# Patient Record
Sex: FEMALE | Race: WHITE | NOT HISPANIC OR LATINO | Employment: FULL TIME | URBAN - METROPOLITAN AREA
[De-identification: names, ages, dates, MRNs, and addresses within clinical notes are randomized per-mention and may not be internally consistent; named-entity substitution may affect disease eponyms.]

---

## 2019-11-04 ENCOUNTER — OFFICE VISIT (OUTPATIENT)
Dept: INTERNAL MEDICINE | Facility: CLINIC | Age: 44
End: 2019-11-04

## 2019-11-04 VITALS
BODY MASS INDEX: 37.14 KG/M2 | SYSTOLIC BLOOD PRESSURE: 110 MMHG | TEMPERATURE: 98.3 F | HEART RATE: 83 BPM | WEIGHT: 209.6 LBS | OXYGEN SATURATION: 98 % | HEIGHT: 63 IN | DIASTOLIC BLOOD PRESSURE: 80 MMHG

## 2019-11-04 DIAGNOSIS — R53.83 FATIGUE, UNSPECIFIED TYPE: ICD-10-CM

## 2019-11-04 DIAGNOSIS — Z00.00 HEALTH CARE MAINTENANCE: Primary | ICD-10-CM

## 2019-11-04 DIAGNOSIS — R73.03 PREDIABETES: ICD-10-CM

## 2019-11-04 PROBLEM — G47.33 OSA (OBSTRUCTIVE SLEEP APNEA): Status: ACTIVE | Noted: 2019-11-04

## 2019-11-04 PROBLEM — M06.9 RHEUMATOID ARTHRITIS: Status: ACTIVE | Noted: 2019-11-04

## 2019-11-04 PROBLEM — Z87.898 HISTORY OF SEIZURE: Status: ACTIVE | Noted: 2019-11-04

## 2019-11-04 PROBLEM — G43.909 MIGRAINES: Status: ACTIVE | Noted: 2019-11-04

## 2019-11-04 PROCEDURE — 99386 PREV VISIT NEW AGE 40-64: CPT | Performed by: NURSE PRACTITIONER

## 2019-11-04 RX ORDER — FOLIC ACID 1 MG/1
1 TABLET ORAL 2 TIMES WEEKLY
COMMUNITY
Start: 2019-10-05

## 2019-11-04 RX ORDER — MEDROXYPROGESTERONE ACETATE 150 MG/ML
INJECTION, SUSPENSION INTRAMUSCULAR
Status: ON HOLD | COMMUNITY
Start: 2019-10-09 | End: 2022-12-19

## 2019-11-04 NOTE — PROGRESS NOTES
Subjective   Lauren Chicas is a 44 y.o. female.     History of Present Illness   The patient is here today for CPE and lab work F/U. To establish care. Has been a few years ago since last PCP.     RA- Dr. Ivette Tang, dx May 2012, feels well controlled, has had to switch meds recently.   Migraines- much better with feverfew  GYN- Olive Armas, G- 2 P- 2, Periods regular, moderate, 4-5 days  MICHAEL- using CPAP, Dr. Pena, [facility]  Biometric screening 08/30/2019- A1c 5.8, fasting glucose 101  Cholesterol ok per patient  The following portions of the patient's history were reviewed and updated as appropriate: allergies, current medications, past family history, past medical history, past social history, past surgical history and problem list.    Review of Systems   Constitutional: Positive for fatigue. Negative for chills and fever.   HENT: Negative for ear pain, rhinorrhea and sore throat.    Eyes: Negative.    Respiratory: Negative for cough and shortness of breath.    Cardiovascular: Negative.    Gastrointestinal: Negative.    Endocrine: Negative for cold intolerance and heat intolerance.   Genitourinary: Negative for breast discharge, breast lump, breast pain, difficulty urinating, dyspareunia, dysuria, flank pain, frequency, genital sores, hematuria, pelvic pain and urgency.   Musculoskeletal: Positive for arthralgias (baseline).   Skin: Positive for rash (will have random short term rashes ).   Allergic/Immunologic: Positive for environmental allergies. Negative for food allergies.   Neurological: Negative.    Hematological: Negative.    Psychiatric/Behavioral: Negative for dysphoric mood and suicidal ideas. The patient is not nervous/anxious.        Objective   Physical Exam   Constitutional: She is oriented to person, place, and time. Vital signs are normal. She appears well-developed and well-nourished. She is cooperative.   Body mass index is 37.13 kg/m².     HENT:   Right Ear: Hearing, tympanic membrane, external  ear and ear canal normal.   Left Ear: Hearing, tympanic membrane, external ear and ear canal normal.   Nose: Nose normal.   Mouth/Throat: Uvula is midline, oropharynx is clear and moist and mucous membranes are normal.   Eyes: Conjunctivae, EOM and lids are normal. Pupils are equal, round, and reactive to light.   Neck: Trachea normal and normal range of motion. Carotid bruit is not present. No thyroid mass and no thyromegaly present.   Cardiovascular: Normal rate, regular rhythm, normal heart sounds and normal pulses.   Pulmonary/Chest: Effort normal and breath sounds normal.   Abdominal: Soft. Normal appearance, normal aorta and bowel sounds are normal. There is no hepatosplenomegaly. There is no tenderness.   Musculoskeletal: Normal range of motion.   Lymphadenopathy:     She has no cervical adenopathy.     She has no axillary adenopathy. No inguinal adenopathy noted on the right or left side.        Right: No inguinal and no supraclavicular adenopathy present.        Left: No inguinal and no supraclavicular adenopathy present.   Neurological: She is alert and oriented to person, place, and time. She has normal strength. No cranial nerve deficit or sensory deficit. She displays a negative Romberg sign. GCS eye subscore is 4. GCS verbal subscore is 5. GCS motor subscore is 6.   Reflex Scores:       Patellar reflexes are 2+ on the right side and 2+ on the left side.  Skin: Skin is warm, dry and intact.   Psychiatric: She has a normal mood and affect. Her speech is normal and behavior is normal. Judgment and thought content normal. Cognition and memory are normal.       Assessment/Plan   There are no diagnoses linked to this encounter.             1. Preventative counseling- she will work on exercise, healthy diet and wt loss  2. Fatigue- check B12 level   3. Prediabetes- recent A1C elevated, work on wt loss, she will go to DM education classes and will recheck labs in 3 months.     Flu vaccine- discussed  Wears  sunscreen   Derm- UTD

## 2019-12-10 ENCOUNTER — TELEPHONE (OUTPATIENT)
Dept: INTERNAL MEDICINE | Facility: CLINIC | Age: 44
End: 2019-12-10

## 2019-12-10 NOTE — TELEPHONE ENCOUNTER
Patient said Rheumatologist was supposed to send lab results to us. We have not received them as of yet. Patient will call rheumatology office to send another request

## 2019-12-10 NOTE — TELEPHONE ENCOUNTER
----- Message from Santhosh Medina Rep sent at 12/9/2019  3:15 PM EST -----  Contact: [phone]  Pt called because her hematologist  sent us her lab results and her hemogloban is low she wants to know if that is why she is so tired all the time

## 2019-12-11 DIAGNOSIS — D64.9 ANEMIA, UNSPECIFIED TYPE: Primary | ICD-10-CM

## 2019-12-15 LAB
BASOPHILS # BLD AUTO: 0 X10E3/UL (ref 0–0.2)
BASOPHILS NFR BLD AUTO: 0 %
EOSINOPHIL # BLD AUTO: 0.3 X10E3/UL (ref 0–0.4)
EOSINOPHIL NFR BLD AUTO: 10 %
ERYTHROCYTE [DISTWIDTH] IN BLOOD BY AUTOMATED COUNT: 18.8 % (ref 12.3–15.4)
FERRITIN SERPL-MCNC: 19 NG/ML (ref 15–150)
HCT VFR BLD AUTO: 32.6 % (ref 34–46.6)
HGB BLD-MCNC: 10.6 G/DL (ref 11.1–15.9)
IMM GRANULOCYTES # BLD AUTO: 0 X10E3/UL (ref 0–0.1)
IMM GRANULOCYTES NFR BLD AUTO: 0 %
IRON SATN MFR SERPL: 12 % (ref 15–55)
IRON SERPL-MCNC: 38 UG/DL (ref 27–159)
LYMPHOCYTES # BLD AUTO: 0.8 X10E3/UL (ref 0.7–3.1)
LYMPHOCYTES NFR BLD AUTO: 25 %
MCH RBC QN AUTO: 26.4 PG (ref 26.6–33)
MCHC RBC AUTO-ENTMCNC: 32.5 G/DL (ref 31.5–35.7)
MCV RBC AUTO: 81 FL (ref 79–97)
MONOCYTES # BLD AUTO: 0.4 X10E3/UL (ref 0.1–0.9)
MONOCYTES NFR BLD AUTO: 12 %
NEUTROPHILS # BLD AUTO: 1.6 X10E3/UL (ref 1.4–7)
NEUTROPHILS NFR BLD AUTO: 53 %
PLATELET # BLD AUTO: 346 X10E3/UL (ref 150–450)
RBC # BLD AUTO: 4.01 X10E6/UL (ref 3.77–5.28)
TIBC SERPL-MCNC: 312 UG/DL (ref 250–450)
UIBC SERPL-MCNC: 274 UG/DL (ref 131–425)
WBC # BLD AUTO: 3.1 X10E3/UL (ref 3.4–10.8)

## 2019-12-16 DIAGNOSIS — D64.9 ANEMIA, UNSPECIFIED TYPE: Primary | ICD-10-CM

## 2020-01-24 DIAGNOSIS — R53.83 FATIGUE, UNSPECIFIED TYPE: ICD-10-CM

## 2020-01-24 DIAGNOSIS — R73.03 PREDIABETES: ICD-10-CM

## 2020-01-24 DIAGNOSIS — Z00.00 HEALTH CARE MAINTENANCE: ICD-10-CM

## 2020-01-28 LAB
ALBUMIN SERPL-MCNC: 4 G/DL (ref 3.8–4.8)
ALBUMIN/GLOB SERPL: 1.1 {RATIO} (ref 1.2–2.2)
ALP SERPL-CCNC: 43 IU/L (ref 39–117)
ALT SERPL-CCNC: 20 IU/L (ref 0–32)
AST SERPL-CCNC: 23 IU/L (ref 0–40)
BILIRUB SERPL-MCNC: 0.4 MG/DL (ref 0–1.2)
BUN SERPL-MCNC: 11 MG/DL (ref 6–24)
BUN/CREAT SERPL: 16 (ref 9–23)
CALCIUM SERPL-MCNC: 9.1 MG/DL (ref 8.7–10.2)
CHLORIDE SERPL-SCNC: 106 MMOL/L (ref 96–106)
CO2 SERPL-SCNC: 22 MMOL/L (ref 20–29)
CREAT SERPL-MCNC: 0.67 MG/DL (ref 0.57–1)
FOLATE SERPL-MCNC: 16.8 NG/ML
GLOBULIN SER CALC-MCNC: 3.7 G/DL (ref 1.5–4.5)
GLUCOSE SERPL-MCNC: 86 MG/DL (ref 65–99)
HBA1C MFR BLD: 5.6 % (ref 4.8–5.6)
POTASSIUM SERPL-SCNC: 4.4 MMOL/L (ref 3.5–5.2)
PROT SERPL-MCNC: 7.7 G/DL (ref 6–8.5)
SODIUM SERPL-SCNC: 141 MMOL/L (ref 134–144)
TSH SERPL DL<=0.005 MIU/L-ACNC: 0.87 UIU/ML (ref 0.45–4.5)
VIT B12 SERPL-MCNC: 473 PG/ML (ref 232–1245)

## 2020-02-20 ENCOUNTER — OFFICE VISIT (OUTPATIENT)
Dept: INTERNAL MEDICINE | Facility: CLINIC | Age: 45
End: 2020-02-20

## 2020-02-20 VITALS
HEART RATE: 94 BPM | SYSTOLIC BLOOD PRESSURE: 122 MMHG | WEIGHT: 213.4 LBS | OXYGEN SATURATION: 98 % | BODY MASS INDEX: 37.81 KG/M2 | TEMPERATURE: 98.9 F | HEIGHT: 63 IN | DIASTOLIC BLOOD PRESSURE: 80 MMHG

## 2020-02-20 DIAGNOSIS — M06.9 RHEUMATOID ARTHRITIS, INVOLVING UNSPECIFIED SITE, UNSPECIFIED RHEUMATOID FACTOR PRESENCE: ICD-10-CM

## 2020-02-20 DIAGNOSIS — R73.03 PREDIABETES: Primary | ICD-10-CM

## 2020-02-20 DIAGNOSIS — D50.9 IRON DEFICIENCY ANEMIA, UNSPECIFIED IRON DEFICIENCY ANEMIA TYPE: ICD-10-CM

## 2020-02-20 DIAGNOSIS — E66.01 CLASS 2 SEVERE OBESITY WITH SERIOUS COMORBIDITY AND BODY MASS INDEX (BMI) OF 37.0 TO 37.9 IN ADULT, UNSPECIFIED OBESITY TYPE (HCC): ICD-10-CM

## 2020-02-20 PROBLEM — E66.812 CLASS 2 SEVERE OBESITY WITH SERIOUS COMORBIDITY IN ADULT: Status: ACTIVE | Noted: 2020-02-20

## 2020-02-20 PROCEDURE — 99214 OFFICE O/P EST MOD 30 MIN: CPT | Performed by: NURSE PRACTITIONER

## 2020-02-20 NOTE — PROGRESS NOTES
"Subjective   Lauren Chicas is a 44 y.o. female.      History of Present Illness   The patient is here today to F/U on lab work. Feeling ok other than ankle injury.     Recently in a boot with tibial tendonitis. Has been on steroids recently with RA. To go back to ortho on Monday.     Prediabetes- struggling some with diet  MICHAEL- using CPAP nightly  Anemia- trying iron rich diet, feeling some better. Period last week, 2-3 days, heavy.     Since being back from trip feeling \"a little off\".  Maybe vertigo.     Just back from [place].   The following portions of the patient's history were reviewed and updated as appropriate: allergies, current medications, past family history, past medical history, past social history, past surgical history and problem list.    Review of Systems   Constitutional: Negative for chills and fever.   Respiratory: Negative.    Cardiovascular: Negative.    Musculoskeletal: Positive for arthralgias.   Psychiatric/Behavioral: Negative for dysphoric mood and suicidal ideas. The patient is not nervous/anxious.        Objective   Physical Exam   Constitutional: She appears well-developed and well-nourished.   Neck: Normal range of motion. Neck supple. No thyromegaly present.   Cardiovascular: Normal rate, regular rhythm, normal heart sounds and intact distal pulses.   Trace ankle edema   Pulmonary/Chest: Effort normal and breath sounds normal.   Lymphadenopathy:     She has no cervical adenopathy.   Skin: Skin is warm and dry.   Psychiatric: She has a normal mood and affect. Her behavior is normal. Judgment and thought content normal.       Vitals:    02/20/20 1526   BP: 122/80   Pulse: 94   Temp: 98.9 °F (37.2 °C)   SpO2: 98%     Body mass index is 37.81 kg/m².      Assessment/Plan   Lauren was seen today for prediabetes.    Diagnoses and all orders for this visit:    Prediabetes    Rheumatoid arthritis, involving unspecified site, unspecified rheumatoid factor presence (CMS/[facility])    Class 2 severe " obesity with serious comorbidity and body mass index (BMI) of 37.0 to 37.9 in adult, unspecified obesity type (CMS/[facility])               1. Prediabetes- work on returning to healthy diet, cooking at home and exercise  2. RA- UTD with rheum  3. Obesity- work on wt loss through portion control, watch sugars and carbs, if needed would try GLP1  4. Anemia- need CBC will request from rheum, needs FITs test     Flu vaccine- defers

## 2020-08-06 ENCOUNTER — TELEPHONE (OUTPATIENT)
Dept: INTERNAL MEDICINE | Facility: CLINIC | Age: 45
End: 2020-08-06

## 2020-08-06 DIAGNOSIS — R73.03 PREDIABETES: Primary | ICD-10-CM

## 2020-08-06 DIAGNOSIS — E66.01 CLASS 2 SEVERE OBESITY WITH SERIOUS COMORBIDITY AND BODY MASS INDEX (BMI) OF 37.0 TO 37.9 IN ADULT, UNSPECIFIED OBESITY TYPE (HCC): ICD-10-CM

## 2020-08-06 DIAGNOSIS — D50.9 IRON DEFICIENCY ANEMIA, UNSPECIFIED IRON DEFICIENCY ANEMIA TYPE: ICD-10-CM

## 2020-08-06 NOTE — TELEPHONE ENCOUNTER
Labs ordered.      ----- Message from EDWINA Alvarado sent at 8/6/2020 12:41 PM EDT -----  CBC, Iron panel, ferritin, CMP, A1C, LP  ----- Message -----  From: Janie Sykes  Sent: 8/6/2020  12:31 PM EDT  To: EDWINA Alvarado    Please advise for labs.

## 2021-09-27 ENCOUNTER — OFFICE VISIT (OUTPATIENT)
Dept: INTERNAL MEDICINE | Facility: CLINIC | Age: 46
End: 2021-09-27

## 2021-09-27 VITALS
HEART RATE: 79 BPM | TEMPERATURE: 97.5 F | WEIGHT: 214 LBS | HEIGHT: 63 IN | BODY MASS INDEX: 37.92 KG/M2 | DIASTOLIC BLOOD PRESSURE: 70 MMHG | SYSTOLIC BLOOD PRESSURE: 110 MMHG | OXYGEN SATURATION: 99 %

## 2021-09-27 DIAGNOSIS — N92.0 MENORRHAGIA WITH REGULAR CYCLE: ICD-10-CM

## 2021-09-27 DIAGNOSIS — D72.819 LEUKOPENIA, UNSPECIFIED TYPE: ICD-10-CM

## 2021-09-27 DIAGNOSIS — R41.3 MEMORY PROBLEM: ICD-10-CM

## 2021-09-27 DIAGNOSIS — Z92.25 PERSONAL HISTORY OF IMMUNOSUPPRESSIVE THERAPY: ICD-10-CM

## 2021-09-27 DIAGNOSIS — Z12.11 SCREEN FOR COLON CANCER: ICD-10-CM

## 2021-09-27 DIAGNOSIS — R53.82 CHRONIC FATIGUE: Primary | ICD-10-CM

## 2021-09-27 PROCEDURE — 99214 OFFICE O/P EST MOD 30 MIN: CPT | Performed by: NURSE PRACTITIONER

## 2021-09-27 RX ORDER — PREDNISONE 1 MG/1
TABLET ORAL TAKE AS DIRECTED
COMMUNITY
Start: 2021-09-21

## 2021-09-27 RX ORDER — LIFITEGRAST 50 MG/ML
SOLUTION/ DROPS OPHTHALMIC
Status: ON HOLD | COMMUNITY
Start: 2021-07-30 | End: 2022-12-19

## 2021-09-27 RX ORDER — METHOTREXATE 25 MG/ML
INJECTION, SOLUTION INTRA-ARTERIAL; INTRAMUSCULAR; INTRAVENOUS WEEKLY
COMMUNITY
Start: 2021-08-02

## 2021-09-27 NOTE — PROGRESS NOTES
"Tristian Chicas is a 46 y.o. female.     Chief Complaint   Patient presents with   • Fatigue     Pt c/o very tired, low energy, no motivation X 2 months.        History of Present Illness   She is here today as a new patient to me with c/o fatigue for 2 months. She sees rheumatology for RA, currently on Embrel and methotrexate. Her most recent lab work showed leukopenia with WBC count of 2.5. She will productive in the morning and then by \"1 o'clock will hit a wall.\" She is having to rest on the couch in the afternoon.  She has dx MICHAEL. She was not using her Cpap nightly. She has since returned to 5-6 hours a night with Cpap use. She feels rested in the mornings.  She feels \"tired about not being able to do the things I normally do.\"  She is scheduled to see rheumatology in December.    She has noted heavier, longer menstrual periods over the past several months.  She has been feeling more down secondary to her fatigue and low energy. Denies any feelings of hopelessness or SI.    The following portions of the patient's history were reviewed and updated as appropriate: allergies, current medications, past family history, past medical history, past social history, past surgical history and problem list.    Review of Systems   Constitutional: Positive for fatigue. Negative for chills and fever.   Respiratory: Negative for cough, chest tightness, shortness of breath and wheezing.    Cardiovascular: Negative for chest pain, palpitations and leg swelling.   Gastrointestinal: Negative for abdominal distention, abdominal pain, anal bleeding, blood in stool, constipation, diarrhea, nausea, rectal pain, vomiting, GERD and indigestion.   Endocrine: Negative for cold intolerance and heat intolerance.   Genitourinary: Positive for vaginal bleeding (heavier menses).   Neurological: Positive for memory problem (foggy thinking). Negative for dizziness, tremors, seizures, syncope, speech difficulty, weakness, " light-headedness, numbness, headache and confusion.   Psychiatric/Behavioral: Positive for dysphoric mood. Negative for sleep disturbance, suicidal ideas and depressed mood. The patient is not nervous/anxious.        Objective   Physical Exam  Constitutional:       Appearance: She is well-developed.   Neck:      Thyroid: No thyroid mass, thyromegaly or thyroid tenderness.      Vascular: No carotid bruit.      Trachea: Trachea normal.   Cardiovascular:      Rate and Rhythm: Normal rate and regular rhythm.      Chest Wall: PMI is not displaced.      Pulses:           Radial pulses are 2+ on the right side and 2+ on the left side.        Dorsalis pedis pulses are 2+ on the right side and 2+ on the left side.        Posterior tibial pulses are 2+ on the right side and 2+ on the left side.      Heart sounds: S1 normal and S2 normal.   Pulmonary:      Effort: Pulmonary effort is normal.      Breath sounds: Normal breath sounds.   Musculoskeletal:      Right lower leg: No edema.      Left lower leg: No edema.   Lymphadenopathy:      Head:      Right side of head: No submental, submandibular, tonsillar or occipital adenopathy.      Left side of head: No submental, submandibular, tonsillar or occipital adenopathy.      Cervical: No cervical adenopathy.   Skin:     General: Skin is warm and dry.      Capillary Refill: Capillary refill takes less than 2 seconds.      Nails: There is no clubbing.   Neurological:      General: No focal deficit present.      Mental Status: She is alert and oriented to person, place, and time. Mental status is at baseline.      Cranial Nerves: Cranial nerves are intact.      Sensory: Sensation is intact.      Motor: Motor function is intact.      Coordination: Coordination is intact.      Gait: Gait is intact.      Deep Tendon Reflexes:      Reflex Scores:       Patellar reflexes are 2+ on the right side and 2+ on the left side.  Psychiatric:         Attention and Perception: Attention and  perception normal.         Mood and Affect: Mood normal. Affect is tearful.         Speech: Speech normal.         Behavior: Behavior normal. Behavior is cooperative.         Thought Content: Thought content normal.         Cognition and Memory: Cognition and memory normal.         Judgment: Judgment normal.         Vitals:    09/27/21 0905   BP: 110/70   Pulse: 79   Temp: 97.5 °F (36.4 °C)   SpO2: 99%      Body mass index is 37.91 kg/m².    Assessment/Plan   Diagnoses and all orders for this visit:    1. Chronic fatigue (Primary)  -     CBC & Differential  -     Vitamin B12  -     Folate  -     Iron Profile  -     Ferritin  -     TSH Rfx On Abnormal To Free T4    2. Memory problem  -     CBC & Differential  -     Vitamin B12  -     Folate  -     Iron Profile  -     Ferritin  -     TSH Rfx On Abnormal To Free T4    3. Personal history of immunosuppressive therapy  -     CBC & Differential  -     Vitamin B12  -     Folate  -     Iron Profile  -     Ferritin  -     TSH Rfx On Abnormal To Free T4    4. Leukopenia, unspecified type  -     CBC & Differential  -     Vitamin B12  -     Folate  -     Iron Profile  -     Ferritin  -     TSH Rfx On Abnormal To Free T4    5. Menorrhagia with regular cycle    6. Screen for colon cancer  -     Amb referral for Screening Colonoscopy      1. Chronic fatigue/Memory problem- will check lab work today. Encouraged her to continue wearing Cpap nightly. If lab work normal would consider further work up for depression as cause of fatigue.  2. Leukopenia/Immunosuppressive Therapy- re-check CBC today  3. Menorrhagia- check labs today. She is to f/u with GYN as scheduled.  4. Screen for colon cancer- referral placed to GI for screening c-scope.  “Discussed risks/benefits to vaccination, reviewed components of the vaccine, discussed VIS, discussed informed consent, informed consent obtained. Patient/Parent was allowed to accept or refuse vaccine. Questions answered to satisfactory state of  patient/Parent. We reviewed typical age appropriate and seasonally appropriate vaccinations. Reviewed immunization history and updated state vaccination form as needed. Patient was counseled on COVID

## 2021-09-28 LAB
BASOPHILS # BLD AUTO: 0.02 10*3/MM3 (ref 0–0.2)
BASOPHILS NFR BLD AUTO: 0.9 % (ref 0–1.5)
EOSINOPHIL # BLD AUTO: 0.17 10*3/MM3 (ref 0–0.4)
EOSINOPHIL NFR BLD AUTO: 7.6 % (ref 0.3–6.2)
ERYTHROCYTE [DISTWIDTH] IN BLOOD BY AUTOMATED COUNT: 16.7 % (ref 12.3–15.4)
FERRITIN SERPL-MCNC: 14.4 NG/ML (ref 13–150)
FOLATE SERPL-MCNC: >20 NG/ML (ref 4.78–24.2)
HCT VFR BLD AUTO: 33.4 % (ref 34–46.6)
HGB BLD-MCNC: 10.8 G/DL (ref 12–15.9)
IMM GRANULOCYTES # BLD AUTO: 0.01 10*3/MM3 (ref 0–0.05)
IMM GRANULOCYTES NFR BLD AUTO: 0.4 % (ref 0–0.5)
IRON SATN MFR SERPL: 5 % (ref 20–50)
IRON SERPL-MCNC: 18 MCG/DL (ref 37–145)
LYMPHOCYTES # BLD AUTO: 0.4 10*3/MM3 (ref 0.7–3.1)
LYMPHOCYTES NFR BLD AUTO: 17.9 % (ref 19.6–45.3)
MCH RBC QN AUTO: 27.6 PG (ref 26.6–33)
MCHC RBC AUTO-ENTMCNC: 32.3 G/DL (ref 31.5–35.7)
MCV RBC AUTO: 85.4 FL (ref 79–97)
MONOCYTES # BLD AUTO: 0.44 10*3/MM3 (ref 0.1–0.9)
MONOCYTES NFR BLD AUTO: 19.6 % (ref 5–12)
NEUTROPHILS # BLD AUTO: 1.2 10*3/MM3 (ref 1.7–7)
NEUTROPHILS NFR BLD AUTO: 53.6 % (ref 42.7–76)
NRBC BLD AUTO-RTO: 0 /100 WBC (ref 0–0.2)
PLATELET # BLD AUTO: 261 10*3/MM3 (ref 140–450)
RBC # BLD AUTO: 3.91 10*6/MM3 (ref 3.77–5.28)
TIBC SERPL-MCNC: 340 MCG/DL
TSH SERPL DL<=0.005 MIU/L-ACNC: 0.78 UIU/ML (ref 0.27–4.2)
UIBC SERPL-MCNC: 322 MCG/DL (ref 112–346)
VIT B12 SERPL-MCNC: 721 PG/ML (ref 211–946)
WBC # BLD AUTO: 2.24 10*3/MM3 (ref 3.4–10.8)

## 2021-09-29 DIAGNOSIS — D50.9 IRON DEFICIENCY ANEMIA, UNSPECIFIED IRON DEFICIENCY ANEMIA TYPE: Primary | ICD-10-CM

## 2022-06-10 ENCOUNTER — PRE-PROCEDURE SCREENING (OUTPATIENT)
Dept: GASTROENTEROLOGY | Facility: CLINIC | Age: 47
End: 2022-06-10

## 2022-06-22 ENCOUNTER — TELEPHONE (OUTPATIENT)
Dept: GASTROENTEROLOGY | Facility: CLINIC | Age: 47
End: 2022-06-22

## 2022-06-22 NOTE — TELEPHONE ENCOUNTER
Last scope: N/A  Does patient has hx of polyps: No  Family hx of polys: Yes  Family hx of colon cancer: No  Taking ASA of blood thinners: No  List of medications:  predniSONE (DELTASONE) 5 MG tablet   folic acid (FOLVITE) 1 MG tablet   BIOTIN PO   Omega-3 Fatty Acids  FEVERFEW PO               OA form scanned in media or last scope scanned into media

## 2022-07-03 ENCOUNTER — PREP FOR SURGERY (OUTPATIENT)
Dept: OTHER | Facility: HOSPITAL | Age: 47
End: 2022-07-03

## 2022-07-03 DIAGNOSIS — Z83.71 FAMILY HISTORY OF COLONIC POLYPS: Primary | ICD-10-CM

## 2022-07-25 PROBLEM — Z83.71 FAMILY HISTORY OF COLONIC POLYPS: Status: ACTIVE | Noted: 2022-07-25

## 2022-07-25 PROBLEM — Z83.719 FAMILY HISTORY OF COLONIC POLYPS: Status: ACTIVE | Noted: 2022-07-25

## 2022-09-08 ENCOUNTER — TELEPHONE (OUTPATIENT)
Dept: GASTROENTEROLOGY | Facility: CLINIC | Age: 47
End: 2022-09-08

## 2022-09-08 NOTE — TELEPHONE ENCOUNTER
Caller: Lauren Chicas    Relationship to patient: Self    Best call back number: [phone]    Chief complaint: PT NEEDS TO MOVE HER PROCEDURE, SHE CANNOT MAKE IT THAT DAY ANYMORE.    Type of visit: COLONOSCOPY     Requested date: NEXT AVAILABLE     If rescheduling, when is the original appointment: 09/21/22

## 2022-10-14 ENCOUNTER — HOSPITAL ENCOUNTER (OUTPATIENT)
Facility: HOSPITAL | Age: 47
Setting detail: HOSPITAL OUTPATIENT SURGERY
Discharge: HOME | End: 2022-10-14
Attending: INTERNAL MEDICINE | Admitting: INTERNAL MEDICINE

## 2022-10-14 ENCOUNTER — ANESTHESIA EVENT (OUTPATIENT)
Dept: GASTROENTEROLOGY | Facility: HOSPITAL | Age: 47
End: 2022-10-14

## 2022-10-14 ENCOUNTER — ANESTHESIA (OUTPATIENT)
Dept: GASTROENTEROLOGY | Facility: HOSPITAL | Age: 47
End: 2022-10-14

## 2022-10-14 VITALS
SYSTOLIC BLOOD PRESSURE: 108 MMHG | WEIGHT: 214.9 LBS | BODY MASS INDEX: 38.08 KG/M2 | HEIGHT: 63 IN | RESPIRATION RATE: 16 BRPM | OXYGEN SATURATION: 100 % | DIASTOLIC BLOOD PRESSURE: 74 MMHG | TEMPERATURE: 98 F | HEART RATE: 82 BPM

## 2022-10-14 LAB
B-HCG UR QL: NEGATIVE
EXPIRATION DATE: NORMAL
INTERNAL NEGATIVE CONTROL: NEGATIVE
INTERNAL POSITIVE CONTROL: POSITIVE
Lab: NORMAL

## 2022-10-14 PROCEDURE — S0260 H&P FOR SURGERY: HCPCS | Performed by: INTERNAL MEDICINE

## 2022-10-14 PROCEDURE — 81025 URINE PREGNANCY TEST: CPT | Performed by: INTERNAL MEDICINE

## 2022-10-14 PROCEDURE — 25010000002 PROPOFOL 10 MG/ML EMULSION: Performed by: NURSE ANESTHETIST, CERTIFIED REGISTERED

## 2022-10-14 PROCEDURE — 45378 DIAGNOSTIC COLONOSCOPY: CPT | Performed by: INTERNAL MEDICINE

## 2022-10-14 RX ORDER — SODIUM CHLORIDE 0.9 % (FLUSH) 0.9 %
10 SYRINGE (ML) INJECTION AS NEEDED
Status: DISCONTINUED | OUTPATIENT
Start: 2022-10-14 | End: 2022-10-14 | Stop reason: HOSPADM

## 2022-10-14 RX ORDER — PROPOFOL 10 MG/ML
VIAL (ML) INTRAVENOUS AS NEEDED
Status: DISCONTINUED | OUTPATIENT
Start: 2022-10-14 | End: 2022-10-14 | Stop reason: SURG

## 2022-10-14 RX ORDER — SODIUM CHLORIDE, SODIUM LACTATE, POTASSIUM CHLORIDE, CALCIUM CHLORIDE 600; 310; 30; 20 MG/100ML; MG/100ML; MG/100ML; MG/100ML
1000 INJECTION, SOLUTION INTRAVENOUS CONTINUOUS
Status: DISCONTINUED | OUTPATIENT
Start: 2022-10-14 | End: 2022-10-14 | Stop reason: HOSPADM

## 2022-10-14 RX ORDER — PROPOFOL 10 MG/ML
VIAL (ML) INTRAVENOUS CONTINUOUS PRN
Status: DISCONTINUED | OUTPATIENT
Start: 2022-10-14 | End: 2022-10-14 | Stop reason: SURG

## 2022-10-14 RX ORDER — LIDOCAINE HYDROCHLORIDE 20 MG/ML
INJECTION, SOLUTION INFILTRATION; PERINEURAL AS NEEDED
Status: DISCONTINUED | OUTPATIENT
Start: 2022-10-14 | End: 2022-10-14 | Stop reason: SURG

## 2022-10-14 RX ADMIN — PROPOFOL 150 MG: 10 INJECTION, EMULSION INTRAVENOUS at 12:42

## 2022-10-14 RX ADMIN — PROPOFOL 50 MG: 10 INJECTION, EMULSION INTRAVENOUS at 12:47

## 2022-10-14 RX ADMIN — Medication 200 MCG/KG/MIN: at 12:42

## 2022-10-14 RX ADMIN — SODIUM CHLORIDE, POTASSIUM CHLORIDE, SODIUM LACTATE AND CALCIUM CHLORIDE 1000 ML: 600; 310; 30; 20 INJECTION, SOLUTION INTRAVENOUS at 12:10

## 2022-10-14 RX ADMIN — LIDOCAINE HYDROCHLORIDE 60 MG: 20 INJECTION, SOLUTION INFILTRATION; PERINEURAL at 12:42

## 2022-10-14 RX ADMIN — PROPOFOL 50 MG: 10 INJECTION, EMULSION INTRAVENOUS at 12:45

## 2022-10-14 NOTE — H&P
[facility], [facility]  Pre Procedure History & Physical    Chief Complaint:   Family history colon polyps    Subjective     HPI:   Patient 47-year-old female with history of rheumatoid arthritis and anemia presenting for screening.  Patient with family history significant for colon polyps here for colonoscopy.    Past Medical History:   Past Medical History:   Diagnosis Date   • Anemia    • Rheumatoid arthritis (HCC)        Past Surgical History:  Past Surgical History:   Procedure Laterality Date   • LASIK         Family History:  Family History   Problem Relation Age of Onset   • Hypertension Mother    • Thyroid nodules Mother    • Hypertension Father    • Hypothyroidism Sister    • Diabetes Maternal Grandmother    • COPD Maternal Grandfather    • Alcohol abuse Maternal Grandfather    • Alzheimer's disease Paternal Grandmother    • COPD Paternal Grandfather    • Heart disease Paternal Uncle    • Heart disease Paternal Aunt    • No Known Problems Daughter    • No Known Problems Son        Social History:   reports that she has never smoked. She has never used smokeless tobacco. She reports that she does not drink alcohol and does not use drugs.    Medications:   Medications Prior to Admission   Medication Sig Dispense Refill Last Dose   • BIOTIN PO Take  by mouth.   10/9/2022   • calcium citrate-vitamin d (CITRACAL) 200-250 MG-UNIT tablet tablet Take  by mouth Daily.   10/9/2022   • dextromethorphan-guaifenesin (MUCINEX DM)  MG per 12 hr tablet Take 1 tablet by mouth Every 12 (Twelve) Hours.   10/9/2022   • ENBREL SURECLICK 50 MG/ML solution auto-injector    10/11/2022   • Ferrous Sulfate (IRON PO) Take 1 tablet by mouth Daily.   10/9/2022   • FEVERFEW PO Take  by mouth.   10/9/2022   • Fexofenadine HCl (MUCINEX ALLERGY PO) Take  by mouth.      • folic acid (FOLVITE) 1 MG tablet    10/9/2022   • Methotrexate Sodium 50 MG/2ML injection    10/6/2022   • Multiple Vitamins-Minerals (MULTIVITAMIN PO)  "Take  by mouth.   10/9/2022   • Omega-3 Fatty Acids (FISH OIL PO) Take  by mouth.   10/9/2022   • predniSONE (DELTASONE) 5 MG tablet    10/9/2022   • Xiidra 5 % ophthalmic solution           Allergies:  Sulfa antibiotics    ROS:    Pertinent items are noted in HPI     Objective     Blood pressure 121/63, pulse 82, resp. rate 16, height 160 cm (63\"), weight 97.5 kg (214 lb 14.4 oz), last menstrual period 10/02/2022, SpO2 (!) 10 %.    Physical Exam   Constitutional: Pt is oriented to person, place, and time and well-developed, well-nourished, and in no distress.   Mouth/Throat: Oropharynx is clear and moist.   Neck: Normal range of motion.   Cardiovascular: Normal rate, regular rhythm and normal heart sounds.    Pulmonary/Chest: Effort normal and breath sounds normal.   Abdominal: Soft. Nontender  Skin: Skin is warm and dry.   Psychiatric: Mood, memory, affect and judgment normal.     Assessment & Plan     Diagnosis:  Family history colon polyps    Anticipated Surgical Procedure:  Colonoscopy    The risks, benefits, and alternatives of this procedure have been discussed with the patient or the responsible party- the patient understands and agrees to proceed.                                                          "

## 2022-10-14 NOTE — BRIEF OP NOTE
COLONOSCOPY  Progress Note    Lauren Chicas  10/14/2022    Pre-op Diagnosis:   Family history of colonic polyps [Z83.71]       Post-Op Diagnosis Codes:     * Family history of colonic polyps [Z83.71]     * Diverticulosis [K57.90]     * Internal hemorrhoids [K64.8]    Procedure/CPT® Codes:        Procedure(s):  COLONOSCOPY into cecum and normal TI        Surgeon(s):  Jerson Trejo MD    Anesthesia: Monitored Anesthesia Care    Staff:   Endo Nurse: Kiersten Chaudhry RN; Awilda Morfin RN         Estimated Blood Loss: minimal    Urine Voided: * No values recorded between 10/14/2022 12:37 PM and 10/14/2022 12:55 PM *    Specimens:                None          Drains: * No LDAs found *    Findings: Colonoscopy to the terminal ileum with normal ileum mucosa.  Diverticulosis in the transverse descending and sigmoid colon with internal hemorrhoids seen.        Complications: None          Jerson Trejo MD     Date: 10/14/2022  Time: 12:57 EDT

## 2022-10-14 NOTE — ANESTHESIA POSTPROCEDURE EVALUATION
"Patient: Lauren Stone    Procedure Summary     Date: 10/14/22 Room / Location:  [facility]    Anesthesia Start: 1237 Anesthesia Stop: 1300    Procedure: COLONOSCOPY into cecum and normal TI Diagnosis:       Family history of colonic polyps      Diverticulosis      Internal hemorrhoids      (Family history of colonic polyps [Z83.71])    Surgeons: Jerson Trejo MD Provider: Brandi Hilton MD    Anesthesia Type: MAC ASA Status: 2          Anesthesia Type: MAC    Vitals  Vitals Value Taken Time   /74 10/14/22 1319   Temp 36.7 °C (98 °F) 10/14/22 1306   Pulse 82 10/14/22 1319   Resp 16 10/14/22 1319   SpO2 100 % 10/14/22 1319           Post Anesthesia Care and Evaluation    Patient location during evaluation: bedside  Patient participation: complete - patient participated  Level of consciousness: sleepy but conscious  Pain score: 0  Pain management: adequate    Airway patency: patent  Anesthetic complications: No anesthetic complications    Cardiovascular status: acceptable  Respiratory status: acceptable  Hydration status: acceptable    Comments: /74 (BP Location: Left arm, Patient Position: Sitting)   Pulse 82   Temp 36.7 °C (98 °F) (Oral)   Resp 16   Ht 160 cm (63\")   Wt 97.5 kg (214 lb 14.4 oz)   LMP 10/02/2022   SpO2 100%   BMI 38.07 kg/m²         "

## 2022-10-14 NOTE — DISCHARGE INSTRUCTIONS
For the next 24 hours patient needs to be with a responsible adult.    For 24 hours DO NOT drive, operate machinery, appliances, drink alcohol, make important decisions or sign legal documents.    Start with a light or bland diet if you are feeling sick to your stomach otherwise advance to regular diet as tolerated.    Follow recommendations on procedure report if provided by your doctor.    Call Dr Trejo for problems [phone].    Problems may include but not limited to: large amounts of bleeding, trouble breathing, repeated vomiting, severe unrelieved pain, fever or chills.

## 2022-12-19 ENCOUNTER — APPOINTMENT (OUTPATIENT)
Dept: GENERAL RADIOLOGY | Facility: HOSPITAL | Age: 47
End: 2022-12-19

## 2022-12-19 ENCOUNTER — HOSPITAL ENCOUNTER (OUTPATIENT)
Facility: HOSPITAL | Age: 47
Setting detail: OBSERVATION
Discharge: HOME | End: 2022-12-20
Attending: EMERGENCY MEDICINE | Admitting: STUDENT IN AN ORGANIZED HEALTH CARE EDUCATION/TRAINING PROGRAM

## 2022-12-19 ENCOUNTER — APPOINTMENT (OUTPATIENT)
Dept: CT IMAGING | Facility: HOSPITAL | Age: 47
End: 2022-12-19

## 2022-12-19 ENCOUNTER — APPOINTMENT (OUTPATIENT)
Dept: CARDIOLOGY | Facility: HOSPITAL | Age: 47
End: 2022-12-19

## 2022-12-19 DIAGNOSIS — J90 PLEURAL EFFUSION: ICD-10-CM

## 2022-12-19 DIAGNOSIS — R07.89 ATYPICAL CHEST PAIN: ICD-10-CM

## 2022-12-19 DIAGNOSIS — R59.0 AXILLARY LYMPHADENOPATHY: ICD-10-CM

## 2022-12-19 DIAGNOSIS — I31.39 PERICARDIAL EFFUSION: Primary | ICD-10-CM

## 2022-12-19 PROBLEM — I31.9 PLEUROPERICARDITIS: Status: ACTIVE | Noted: 2022-12-19

## 2022-12-19 LAB
ALBUMIN SERPL-MCNC: 3.7 G/DL (ref 3.5–5.2)
ALBUMIN/GLOB SERPL: 1 G/DL
ALP SERPL-CCNC: 33 U/L (ref 39–117)
ALT SERPL W P-5'-P-CCNC: 13 U/L (ref 1–33)
ANION GAP SERPL CALCULATED.3IONS-SCNC: 11.9 MMOL/L (ref 5–15)
AORTIC DIMENSIONLESS INDEX: 0.9 (DI)
ASCENDING AORTA: 3.4 CM
AST SERPL-CCNC: 16 U/L (ref 1–32)
BASOPHILS # BLD AUTO: 0.03 10*3/MM3 (ref 0–0.2)
BASOPHILS NFR BLD AUTO: 0.4 % (ref 0–1.5)
BH CV ECHO MEAS - ACS: 1.83 CM
BH CV ECHO MEAS - AO MAX PG: 11.5 MMHG
BH CV ECHO MEAS - AO MEAN PG: 6.2 MMHG
BH CV ECHO MEAS - AO ROOT DIAM: 3.4 CM
BH CV ECHO MEAS - AO V2 MAX: 169.7 CM/SEC
BH CV ECHO MEAS - AO V2 VTI: 32.5 CM
BH CV ECHO MEAS - AVA(I,D): 2.8 CM2
BH CV ECHO MEAS - EDV(CUBED): 54.3 ML
BH CV ECHO MEAS - EDV(MOD-SP2): 106 ML
BH CV ECHO MEAS - EDV(MOD-SP4): 107 ML
BH CV ECHO MEAS - EF(MOD-BP): 62.9 %
BH CV ECHO MEAS - EF(MOD-SP2): 66 %
BH CV ECHO MEAS - EF(MOD-SP4): 62.6 %
BH CV ECHO MEAS - ESV(CUBED): 21.7 ML
BH CV ECHO MEAS - ESV(MOD-SP2): 36 ML
BH CV ECHO MEAS - ESV(MOD-SP4): 40 ML
BH CV ECHO MEAS - FS: 26.4 %
BH CV ECHO MEAS - IVS/LVPW: 1.01 CM
BH CV ECHO MEAS - IVSD: 0.82 CM
BH CV ECHO MEAS - LV MASS(C)D: 88.3 GRAMS
BH CV ECHO MEAS - LV MAX PG: 10 MMHG
BH CV ECHO MEAS - LV MEAN PG: 4.6 MMHG
BH CV ECHO MEAS - LV V1 MAX: 157.9 CM/SEC
BH CV ECHO MEAS - LV V1 VTI: 29.8 CM
BH CV ECHO MEAS - LVIDD: 3.8 CM
BH CV ECHO MEAS - LVIDS: 2.8 CM
BH CV ECHO MEAS - LVOT AREA: 3 CM2
BH CV ECHO MEAS - LVOT DIAM: 1.96 CM
BH CV ECHO MEAS - LVPWD: 0.82 CM
BH CV ECHO MEAS - MR MAX PG: 13.4 MMHG
BH CV ECHO MEAS - MR MAX VEL: 183.2 CM/SEC
BH CV ECHO MEAS - MV A DUR: 0.13 SEC
BH CV ECHO MEAS - MV A MAX VEL: 95.2 CM/SEC
BH CV ECHO MEAS - MV DEC SLOPE: 492.6 CM/SEC2
BH CV ECHO MEAS - MV DEC TIME: 0.29 MSEC
BH CV ECHO MEAS - MV E MAX VEL: 106 CM/SEC
BH CV ECHO MEAS - MV E/A: 1.11
BH CV ECHO MEAS - MV MAX PG: 3.8 MMHG
BH CV ECHO MEAS - MV MEAN PG: 2.1 MMHG
BH CV ECHO MEAS - MV P1/2T: 57.3 MSEC
BH CV ECHO MEAS - MV V2 VTI: 22.9 CM
BH CV ECHO MEAS - MVA(P1/2T): 3.8 CM2
BH CV ECHO MEAS - MVA(VTI): 3.9 CM2
BH CV ECHO MEAS - PA ACC TIME: 0.07 SEC
BH CV ECHO MEAS - PA PR(ACCEL): 46.9 MMHG
BH CV ECHO MEAS - PA V2 MAX: 123.5 CM/SEC
BH CV ECHO MEAS - PULM A REVS DUR: 0.11 SEC
BH CV ECHO MEAS - PULM A REVS VEL: 28.9 CM/SEC
BH CV ECHO MEAS - PULM DIAS VEL: 37.8 CM/SEC
BH CV ECHO MEAS - PULM S/D: 0.88
BH CV ECHO MEAS - PULM SYS VEL: 33.1 CM/SEC
BH CV ECHO MEAS - RV MAX PG: 5 MMHG
BH CV ECHO MEAS - RV V1 MAX: 111.4 CM/SEC
BH CV ECHO MEAS - RV V1 VTI: 22.3 CM
BH CV ECHO MEAS - RVSP: 29 MMHG
BH CV ECHO MEAS - SV(LVOT): 89.8 ML
BH CV ECHO MEAS - SV(MOD-SP2): 70 ML
BH CV ECHO MEAS - SV(MOD-SP4): 67 ML
BH CV ECHO MEAS - TAPSE (>1.6): 2.7 CM
BH CV ECHO MEAS - TR MAX PG: 24.4 MMHG
BH CV ECHO MEAS - TR MAX VEL: 247 CM/SEC
BH CV XLRA - RV BASE: 3.2 CM
BH CV XLRA - RV LENGTH: 7.8 CM
BH CV XLRA - RV MID: 3.3 CM
BILIRUB SERPL-MCNC: 0.6 MG/DL (ref 0–1.2)
BUN SERPL-MCNC: 12 MG/DL (ref 6–20)
BUN/CREAT SERPL: 19.4 (ref 7–25)
CALCIUM SPEC-SCNC: 8.7 MG/DL (ref 8.6–10.5)
CHLORIDE SERPL-SCNC: 106 MMOL/L (ref 98–107)
CO2 SERPL-SCNC: 22.1 MMOL/L (ref 22–29)
CREAT SERPL-MCNC: 0.62 MG/DL (ref 0.57–1)
D DIMER PPP FEU-MCNC: 3.56 MCGFEU/ML (ref 0–0.5)
DEPRECATED RDW RBC AUTO: 45 FL (ref 37–54)
EGFRCR SERPLBLD CKD-EPI 2021: 110 ML/MIN/1.73
EOSINOPHIL # BLD AUTO: 0.21 10*3/MM3 (ref 0–0.4)
EOSINOPHIL NFR BLD AUTO: 3 % (ref 0.3–6.2)
ERYTHROCYTE [DISTWIDTH] IN BLOOD BY AUTOMATED COUNT: 15.6 % (ref 12.3–15.4)
GLOBULIN UR ELPH-MCNC: 3.8 GM/DL
GLUCOSE SERPL-MCNC: 104 MG/DL (ref 65–99)
HCG SERPL QL: NEGATIVE
HCT VFR BLD AUTO: 29.9 % (ref 34–46.6)
HGB BLD-MCNC: 9.2 G/DL (ref 12–15.9)
HOLD SPECIMEN: NORMAL
HOLD SPECIMEN: NORMAL
IMM GRANULOCYTES # BLD AUTO: 0.04 10*3/MM3 (ref 0–0.05)
IMM GRANULOCYTES NFR BLD AUTO: 0.6 % (ref 0–0.5)
LEFT ATRIUM VOLUME INDEX: 25.1 ML/M2
LYMPHOCYTES # BLD AUTO: 0.08 10*3/MM3 (ref 0.7–3.1)
LYMPHOCYTES NFR BLD AUTO: 1.2 % (ref 19.6–45.3)
MAGNESIUM SERPL-MCNC: 1.9 MG/DL (ref 1.6–2.6)
MAXIMAL PREDICTED HEART RATE: 172 BPM
MCH RBC QN AUTO: 24.9 PG (ref 26.6–33)
MCHC RBC AUTO-ENTMCNC: 30.8 G/DL (ref 31.5–35.7)
MCV RBC AUTO: 80.8 FL (ref 79–97)
MONOCYTES # BLD AUTO: 0.17 10*3/MM3 (ref 0.1–0.9)
MONOCYTES NFR BLD AUTO: 2.4 % (ref 5–12)
NEUTROPHILS NFR BLD AUTO: 6.41 10*3/MM3 (ref 1.7–7)
NEUTROPHILS NFR BLD AUTO: 92.4 % (ref 42.7–76)
NRBC BLD AUTO-RTO: 0 /100 WBC (ref 0–0.2)
NT-PROBNP SERPL-MCNC: 524 PG/ML (ref 0–450)
PLATELET # BLD AUTO: 376 10*3/MM3 (ref 140–450)
PMV BLD AUTO: 9.1 FL (ref 6–12)
POTASSIUM SERPL-SCNC: 3.8 MMOL/L (ref 3.5–5.2)
PROT SERPL-MCNC: 7.5 G/DL (ref 6–8.5)
QT INTERVAL: 339 MS
RBC # BLD AUTO: 3.7 10*6/MM3 (ref 3.77–5.28)
SINUS: 3.3 CM
SODIUM SERPL-SCNC: 140 MMOL/L (ref 136–145)
STJ: 2.6 CM
STRESS TARGET HR: 146 BPM
TROPONIN T SERPL-MCNC: <0.01 NG/ML (ref 0–0.03)
TSH SERPL DL<=0.05 MIU/L-ACNC: 0.71 UIU/ML (ref 0.27–4.2)
WBC NRBC COR # BLD: 6.94 10*3/MM3 (ref 3.4–10.8)
WHOLE BLOOD HOLD COAG: NORMAL
WHOLE BLOOD HOLD SPECIMEN: NORMAL

## 2022-12-19 PROCEDURE — 84443 ASSAY THYROID STIM HORMONE: CPT | Performed by: PHYSICIAN ASSISTANT

## 2022-12-19 PROCEDURE — 93005 ELECTROCARDIOGRAM TRACING: CPT | Performed by: EMERGENCY MEDICINE

## 2022-12-19 PROCEDURE — 25010000002 KETOROLAC TROMETHAMINE PER 15 MG: Performed by: INTERNAL MEDICINE

## 2022-12-19 PROCEDURE — 94799 UNLISTED PULMONARY SVC/PX: CPT

## 2022-12-19 PROCEDURE — G0378 HOSPITAL OBSERVATION PER HR: HCPCS

## 2022-12-19 PROCEDURE — 93308 TTE F-UP OR LMTD: CPT

## 2022-12-19 PROCEDURE — 96374 THER/PROPH/DIAG INJ IV PUSH: CPT

## 2022-12-19 PROCEDURE — 83735 ASSAY OF MAGNESIUM: CPT | Performed by: PHYSICIAN ASSISTANT

## 2022-12-19 PROCEDURE — 84703 CHORIONIC GONADOTROPIN ASSAY: CPT | Performed by: PHYSICIAN ASSISTANT

## 2022-12-19 PROCEDURE — 93325 DOPPLER ECHO COLOR FLOW MAPG: CPT | Performed by: INTERNAL MEDICINE

## 2022-12-19 PROCEDURE — 93010 ELECTROCARDIOGRAM REPORT: CPT | Performed by: INTERNAL MEDICINE

## 2022-12-19 PROCEDURE — 93321 DOPPLER ECHO F-UP/LMTD STD: CPT

## 2022-12-19 PROCEDURE — 93308 TTE F-UP OR LMTD: CPT | Performed by: INTERNAL MEDICINE

## 2022-12-19 PROCEDURE — 93321 DOPPLER ECHO F-UP/LMTD STD: CPT | Performed by: INTERNAL MEDICINE

## 2022-12-19 PROCEDURE — 93325 DOPPLER ECHO COLOR FLOW MAPG: CPT

## 2022-12-19 PROCEDURE — 84484 ASSAY OF TROPONIN QUANT: CPT | Performed by: PHYSICIAN ASSISTANT

## 2022-12-19 PROCEDURE — 83880 ASSAY OF NATRIURETIC PEPTIDE: CPT | Performed by: PHYSICIAN ASSISTANT

## 2022-12-19 PROCEDURE — 80053 COMPREHEN METABOLIC PANEL: CPT | Performed by: PHYSICIAN ASSISTANT

## 2022-12-19 PROCEDURE — 99284 EMERGENCY DEPT VISIT MOD MDM: CPT

## 2022-12-19 PROCEDURE — 25010000002 PERFLUTREN (DEFINITY) 8.476 MG IN SODIUM CHLORIDE (PF) 0.9 % 10 ML INJECTION: Performed by: INTERNAL MEDICINE

## 2022-12-19 PROCEDURE — 0 IOPAMIDOL PER 1 ML: Performed by: EMERGENCY MEDICINE

## 2022-12-19 PROCEDURE — 96375 TX/PRO/DX INJ NEW DRUG ADDON: CPT

## 2022-12-19 PROCEDURE — 71275 CT ANGIOGRAPHY CHEST: CPT

## 2022-12-19 PROCEDURE — 93005 ELECTROCARDIOGRAM TRACING: CPT

## 2022-12-19 PROCEDURE — 71045 X-RAY EXAM CHEST 1 VIEW: CPT

## 2022-12-19 PROCEDURE — 85379 FIBRIN DEGRADATION QUANT: CPT | Performed by: PHYSICIAN ASSISTANT

## 2022-12-19 PROCEDURE — 85025 COMPLETE CBC W/AUTO DIFF WBC: CPT | Performed by: PHYSICIAN ASSISTANT

## 2022-12-19 RX ORDER — ACETAMINOPHEN 650 MG/1
650 SUPPOSITORY RECTAL EVERY 4 HOURS PRN
Status: DISCONTINUED | OUTPATIENT
Start: 2022-12-19 | End: 2022-12-20 | Stop reason: HOSPADM

## 2022-12-19 RX ORDER — SODIUM CHLORIDE 9 MG/ML
40 INJECTION, SOLUTION INTRAVENOUS AS NEEDED
Status: DISCONTINUED | OUTPATIENT
Start: 2022-12-19 | End: 2022-12-20 | Stop reason: HOSPADM

## 2022-12-19 RX ORDER — GUAIFENESIN AND DEXTROMETHORPHAN HYDROBROMIDE 600; 30 MG/1; MG/1
1 TABLET, EXTENDED RELEASE ORAL EVERY 12 HOURS
Status: DISCONTINUED | OUTPATIENT
Start: 2022-12-19 | End: 2022-12-20 | Stop reason: HOSPADM

## 2022-12-19 RX ORDER — FAMOTIDINE 10 MG/ML
20 INJECTION, SOLUTION INTRAVENOUS EVERY 12 HOURS SCHEDULED
Status: DISCONTINUED | OUTPATIENT
Start: 2022-12-19 | End: 2022-12-20 | Stop reason: HOSPADM

## 2022-12-19 RX ORDER — FOLIC ACID 1 MG/1
1 TABLET ORAL DAILY
Status: DISCONTINUED | OUTPATIENT
Start: 2022-12-19 | End: 2022-12-20 | Stop reason: HOSPADM

## 2022-12-19 RX ORDER — FERROUS SULFATE 325(65) MG
325 TABLET ORAL
Status: DISCONTINUED | OUTPATIENT
Start: 2022-12-20 | End: 2022-12-20 | Stop reason: HOSPADM

## 2022-12-19 RX ORDER — DIPHENOXYLATE HYDROCHLORIDE AND ATROPINE SULFATE 2.5; .025 MG/1; MG/1
1 TABLET ORAL DAILY
Status: DISCONTINUED | OUTPATIENT
Start: 2022-12-19 | End: 2022-12-20 | Stop reason: HOSPADM

## 2022-12-19 RX ORDER — HYDROXYCHLOROQUINE SULFATE 200 MG/1
TABLET, FILM COATED ORAL 2 TIMES DAILY
COMMUNITY

## 2022-12-19 RX ORDER — B-COMPLEX WITH VITAMIN C
1 TABLET ORAL DAILY
Status: DISCONTINUED | OUTPATIENT
Start: 2022-12-19 | End: 2022-12-19

## 2022-12-19 RX ORDER — KETOROLAC TROMETHAMINE 30 MG/ML
30 INJECTION, SOLUTION INTRAMUSCULAR; INTRAVENOUS EVERY 6 HOURS PRN
Status: DISCONTINUED | OUTPATIENT
Start: 2022-12-19 | End: 2022-12-20 | Stop reason: HOSPADM

## 2022-12-19 RX ORDER — ONDANSETRON 2 MG/ML
4 INJECTION INTRAMUSCULAR; INTRAVENOUS EVERY 6 HOURS PRN
Status: DISCONTINUED | OUTPATIENT
Start: 2022-12-19 | End: 2022-12-20 | Stop reason: HOSPADM

## 2022-12-19 RX ORDER — SODIUM CHLORIDE 0.9 % (FLUSH) 0.9 %
10 SYRINGE (ML) INJECTION AS NEEDED
Status: DISCONTINUED | OUTPATIENT
Start: 2022-12-19 | End: 2022-12-20 | Stop reason: HOSPADM

## 2022-12-19 RX ORDER — NITROGLYCERIN 0.4 MG/1
0.4 TABLET SUBLINGUAL
Status: DISCONTINUED | OUTPATIENT
Start: 2022-12-19 | End: 2022-12-20 | Stop reason: HOSPADM

## 2022-12-19 RX ORDER — ACETAMINOPHEN 325 MG/1
650 TABLET ORAL EVERY 4 HOURS PRN
Status: DISCONTINUED | OUTPATIENT
Start: 2022-12-19 | End: 2022-12-20 | Stop reason: HOSPADM

## 2022-12-19 RX ORDER — HYDROXYCHLOROQUINE SULFATE 200 MG/1
200 TABLET, FILM COATED ORAL 2 TIMES DAILY
Status: DISCONTINUED | OUTPATIENT
Start: 2022-12-19 | End: 2022-12-20 | Stop reason: HOSPADM

## 2022-12-19 RX ORDER — ACETAMINOPHEN 160 MG/5ML
650 SOLUTION ORAL EVERY 4 HOURS PRN
Status: DISCONTINUED | OUTPATIENT
Start: 2022-12-19 | End: 2022-12-20 | Stop reason: HOSPADM

## 2022-12-19 RX ORDER — SODIUM CHLORIDE 0.9 % (FLUSH) 0.9 %
10 SYRINGE (ML) INJECTION EVERY 12 HOURS SCHEDULED
Status: DISCONTINUED | OUTPATIENT
Start: 2022-12-19 | End: 2022-12-20 | Stop reason: HOSPADM

## 2022-12-19 RX ORDER — PREDNISONE 10 MG/1
5 TABLET ORAL
Status: DISCONTINUED | OUTPATIENT
Start: 2022-12-20 | End: 2022-12-20 | Stop reason: HOSPADM

## 2022-12-19 RX ADMIN — Medication 1 TABLET: at 20:14

## 2022-12-19 RX ADMIN — KETOROLAC TROMETHAMINE 30 MG: 30 INJECTION, SOLUTION INTRAMUSCULAR at 20:12

## 2022-12-19 RX ADMIN — GUAIFENESIN AND DEXTROMETHORPHAN HYDROBROMIDE 1 TABLET: 600; 30 TABLET, EXTENDED RELEASE ORAL at 20:14

## 2022-12-19 RX ADMIN — IOPAMIDOL 95 ML: 755 INJECTION, SOLUTION INTRAVENOUS at 11:50

## 2022-12-19 RX ADMIN — FOLIC ACID 1 MG: 1 TABLET ORAL at 20:43

## 2022-12-19 RX ADMIN — CALCIUM CARBONATE-VITAMIN D TAB 500 MG-200 UNIT 1 TABLET: 500-200 TAB at 20:13

## 2022-12-19 RX ADMIN — HYDROXYCHLOROQUINE SULFATE 200 MG: 200 TABLET, FILM COATED ORAL at 20:14

## 2022-12-19 RX ADMIN — Medication 10 ML: at 18:14

## 2022-12-19 RX ADMIN — PERFLUTREN 2 ML: 6.52 INJECTION, SUSPENSION INTRAVENOUS at 15:39

## 2022-12-19 RX ADMIN — Medication 10 ML: at 20:13

## 2022-12-19 RX ADMIN — FAMOTIDINE 20 MG: 10 INJECTION INTRAVENOUS at 18:14

## 2022-12-19 NOTE — ED NOTES
Nursing report ED to floor  Lauren Chicas  47 y.o.  female    HPI :   Chief Complaint   Patient presents with    Chest Pain       Admitting doctor:   Peri Varma MD    Admitting diagnosis:   The primary encounter diagnosis was Pericardial effusion. Diagnoses of Pleural effusion, Axillary lymphadenopathy, and Atypical chest pain were also pertinent to this visit.    Code status:   Current Code Status       Date Active Code Status Order ID Comments User Context       Not on file            Allergies:   Sulfa antibiotics    Isolation:   No active isolations    Intake and Output  No intake or output data in the 24 hours ending 12/19/22 1322    Weight:   There were no vitals filed for this visit.    Most recent vitals:   Vitals:    12/19/22 0955 12/19/22 1017 12/19/22 1101 12/19/22 1201   BP:  148/91 130/86 164/93   Pulse: 115 105 93 92   Resp: 16      Temp: 99.4 °F (37.4 °C)      TempSrc: Tympanic      SpO2: 98% 98% 97% 99%       Active LDAs/IV Access:   Lines, Drains & Airways       Active LDAs       Name Placement date Placement time Site Days    Peripheral IV 12/19/22 1019 Left Antecubital 12/19/22  1019  Antecubital  less than 1                    Labs (abnormal labs have a star):   Labs Reviewed   COMPREHENSIVE METABOLIC PANEL - Abnormal; Notable for the following components:       Result Value    Glucose 104 (*)     Alkaline Phosphatase 33 (*)     All other components within normal limits    Narrative:     GFR Normal >60  Chronic Kidney Disease <60  Kidney Failure <15     BNP (IN-HOUSE) - Abnormal; Notable for the following components:    proBNP 524.0 (*)     All other components within normal limits    Narrative:     Among patients with dyspnea, NT-proBNP is highly sensitive for the detection of acute congestive heart failure. In addition NT-proBNP of <300 pg/ml effectively rules out acute congestive heart failure with 99% negative predictive value.    Results may be falsely decreased if patient taking Biotin.    "  D-DIMER, QUANTITATIVE - Abnormal; Notable for the following components:    D-Dimer, Quantitative 3.56 (*)     All other components within normal limits    Narrative:     According to the assay 's published package insert, a normal (<0.50 MCGFEU/mL) D-dimer result in conjunction with a non-high clinical probability assessment, excludes deep vein thrombosis (DVT) and pulmonary embolism (PE) with high sensitivity.    D-dimer values increase with age and this can make VTE exclusion of an older population difficult. To address this, the [employer], based on best available evidence and recent guidelines, recommends that clinicians use age-adjusted D-dimer thresholds in patients greater than 50 years of age with: a) a low probability of PE who do not meet all Pulmonary Embolism Rule Out Criteria, or b) in those with intermediate probability of PE.   The formula for an age-adjusted D-dimer cut-off is \"age/100\".  For example, a 60 year old patient would have an age-adjusted cut-off of 0.60 MCGFEU/mL and an 80 year old 0.80 MCGFEU/mL.   CBC WITH AUTO DIFFERENTIAL - Abnormal; Notable for the following components:    RBC 3.70 (*)     Hemoglobin 9.2 (*)     Hematocrit 29.9 (*)     MCH 24.9 (*)     MCHC 30.8 (*)     RDW 15.6 (*)     Neutrophil % 92.4 (*)     Lymphocyte % 1.2 (*)     Monocyte % 2.4 (*)     Immature Grans % 0.6 (*)     Lymphocytes, Absolute 0.08 (*)     All other components within normal limits   HCG, SERUM, QUALITATIVE - Normal   TROPONIN (IN-HOUSE) - Normal    Narrative:     Troponin T Reference Range:  <= 0.03 ng/mL-   Negative for AMI  >0.03 ng/mL-     Abnormal for myocardial necrosis.  Clinicians would have to utilize clinical acumen, EKG, Troponin and serial changes to determine if it is an Acute Myocardial Infarction or myocardial injury due to an underlying chronic condition.       Results may be falsely decreased if patient taking Biotin.     MAGNESIUM - Normal   TSH - " Normal   RAINBOW DRAW    Narrative:     The following orders were created for panel order [place] Draw.  Procedure                               Abnormality         Status                     ---------                               -----------         ------                     Green Top (Gel)[[id number]]                                  Final result               Lavender Top[[id number]]                                     Final result               Gold Top - SST[[id number]]                                   Final result               Light Blue Top[[id number]]                                   Final result                 Please view results for these tests on the individual orders.   GREEN TOP   LAVENDER TOP   GOLD TOP - SST   LIGHT BLUE TOP   CBC AND DIFFERENTIAL    Narrative:     The following orders were created for panel order CBC & Differential.  Procedure                               Abnormality         Status                     ---------                               -----------         ------                     CBC Auto Differential[[id number]]        Abnormal            Final result                 Please view results for these tests on the individual orders.       EKG:   ECG 12 Lead Chest Pain   Preliminary Result   HEART RATE= 95  bpm   RR Interval= 632  ms   AR Interval= 149  ms   P Horizontal Axis= -17  deg   P Front Axis= 78  deg   QRSD Interval= 70  ms   QT Interval= 339  ms   QRS Axis= 19  deg   T Wave Axis= 52  deg   - ABNORMAL ECG -   Sinus rhythm   Abnormal R-wave progression, early transition   ST elevation suggests acute pericarditis   Electronically Signed By:    Date and Time of Study: 2022-12-19 10:00:32          Meds given in ED:   Medications   sodium chloride 0.9 % flush 10 mL (has no administration in time range)   iopamidol (ISOVUE-370) 76 % injection 100 mL (95 mL Intravenous Given by Other 12/19/22 8810)       Imaging results:  CT Angiogram Chest    Result Date: 12/19/2022  1. There  is no convincing evidence for pulmonary thromboemboli. 2. There are minimal bilateral pleural effusions and pericardial effusion. There is also significant bilateral axillary and subpectoral lymphadenopathy. The lymphadenopathy may be reactive, but is worrisome and is indeterminate at this point. Close follow-up is recommended.  CHECK CHECK       Ambulatory status:   - UP AD JULIO    Social issues:   Social History     Socioeconomic History    Marital status:     Number of children: 2   Tobacco Use    Smoking status: Never    Smokeless tobacco: Never   Vaping Use    Vaping Use: Never used   Substance and Sexual Activity    Alcohol use: No    Drug use: No    Sexual activity: Yes     Partners: Male     Birth control/protection: Vasectomy       NIH Stroke Scale:         Ana Anderson RN  12/19/22 13:22 EST

## 2022-12-19 NOTE — PLAN OF CARE
Goal Outcome Evaluation:      Vitals as charted, no c/o pain, up ad casimiro, oncology consult complete, echo and CT chest complete, will continue to monitor.           Problem: Adult Inpatient Plan of Care  Goal: Absence of Hospital-Acquired Illness or Injury  Intervention: Identify and Manage Fall Risk  Recent Flowsheet Documentation  Taken 12/19/2022 1800 by Yohana Snowden RN  Safety Promotion/Fall Prevention: safety round/check completed  Taken 12/19/2022 1600 by Yohana Snowden RN  Safety Promotion/Fall Prevention: safety round/check completed  Taken 12/19/2022 1423 by Yohana Snowden RN  Safety Promotion/Fall Prevention: safety round/check completed  Intervention: Prevent Skin Injury  Recent Flowsheet Documentation  Taken 12/19/2022 1423 by Yohana Snowden RN  Body Position: sitting up in bed  Intervention: Prevent and Manage VTE (Venous Thromboembolism) Risk  Recent Flowsheet Documentation  Taken 12/19/2022 1423 by Yohana Snowden RN  Activity Management: activity adjusted per tolerance  Goal: Optimal Comfort and Wellbeing  Intervention: Provide Person-Centered Care  Recent Flowsheet Documentation  Taken 12/19/2022 1423 by Yohana Snowden RN  Trust Relationship/Rapport: care explained  Goal: Readiness for Transition of Care  Intervention: Mutually Develop Transition Plan  Recent Flowsheet Documentation  Taken 12/19/2022 1434 by Yohana Snowden RN  Transportation Anticipated: family or friend will provide  Patient/Family Anticipated Services at Transition: none  Patient/Family Anticipates Transition to: home  Taken 12/19/2022 1433 by Yohana Snowden RN  Equipment Currently Used at Home: cpap

## 2022-12-19 NOTE — ED NOTES
"Pt states she had \"heart racing\" Friday, woke up Saturday c severe CP and SOA and n/v pt reports trouble sleeping last night due to pain and numbness/tingling in left arm, sent to ED by PCP, pt denies CP at this time,denies cough/fevers/chills pt a/o 4 at this time    .PPE per protocol utilized throughout entire patient encounter.     "

## 2022-12-19 NOTE — ED PROVIDER NOTES
MD ATTESTATION NOTE    The STACIE and I have discussed this patient's history, physical exam, and treatment plan.  I have reviewed the documentation and personally had a face to face interaction with the patient. I affirm the documentation and agree with the treatment and plan.  The attached note describes my personal findings.    I provided a substantive portion of the care of this patient. I personally performed the physical exam, in its entirety.    Lauren Chicas is a 47 y.o. female who presents to the ED c/o having palpitations and a feeling of tachycardia on Friday.  She states that she developed some pain in her chest on Saturday.  She reports it felt tight and radiated to her back.  She states she has some tingling in her left arm.  She has never had similar episodes in the past.  She does report she has a strong family history of heart disease.  She has no known heart or lung issues.  She states she has never had any heart or lung problems.  She does report she was recently diagnosed with vasculitis.  She has never had a blood clot.  She is not on blood thinners.      On exam:  GENERAL: Awake,, no calf tenderness or swelling alert, no acute distress  SKIN: Warm, dry  HENT: Normocephalic, atraumatic  EYES: no scleral icterus  CV: regular rhythm, regular rate  RESPIRATORY: normal effort, lungs clear  ABDOMEN: soft, nontender, nondistended  MUSCULOSKELETAL: no deformity  NEURO: alert, moves all extremities, follows commands    Labs  Recent Results (from the past 24 hour(s))   ECG 12 Lead Chest Pain    Collection Time: 12/19/22 10:00 AM   Result Value Ref Range    QT Interval 339 ms   Green Top (Gel)    Collection Time: 12/19/22 10:21 AM   Result Value Ref Range    Extra Tube Hold for add-ons.    Lavender Top    Collection Time: 12/19/22 10:21 AM   Result Value Ref Range    Extra Tube hold for add-on    Gold Top - SST    Collection Time: 12/19/22 10:21 AM   Result Value Ref Range    Extra Tube Hold for add-ons.     Light Blue Top    Collection Time: 12/19/22 10:21 AM   Result Value Ref Range    Extra Tube Hold for add-ons.    Comprehensive Metabolic Panel    Collection Time: 12/19/22 10:21 AM    Specimen: Blood   Result Value Ref Range    Glucose 104 (H) 65 - 99 mg/dL    BUN 12 6 - 20 mg/dL    Creatinine 0.62 0.57 - 1.00 mg/dL    Sodium 140 136 - 145 mmol/L    Potassium 3.8 3.5 - 5.2 mmol/L    Chloride 106 98 - 107 mmol/L    CO2 22.1 22.0 - 29.0 mmol/L    Calcium 8.7 8.6 - 10.5 mg/dL    Total Protein 7.5 6.0 - 8.5 g/dL    Albumin 3.70 3.50 - 5.20 g/dL    ALT (SGPT) 13 1 - 33 U/L    AST (SGOT) 16 1 - 32 U/L    Alkaline Phosphatase 33 (L) 39 - 117 U/L    Total Bilirubin 0.6 0.0 - 1.2 mg/dL    Globulin 3.8 gm/dL    A/G Ratio 1.0 g/dL    BUN/Creatinine Ratio 19.4 7.0 - 25.0    Anion Gap 11.9 5.0 - 15.0 mmol/L    eGFR 110.0 >60.0 mL/min/1.73   hCG, Serum, Qualitative    Collection Time: 12/19/22 10:21 AM    Specimen: Blood   Result Value Ref Range    HCG Qualitative Negative Negative   BNP    Collection Time: 12/19/22 10:21 AM    Specimen: Blood   Result Value Ref Range    proBNP 524.0 (H) 0.0 - 450.0 pg/mL   Troponin    Collection Time: 12/19/22 10:21 AM    Specimen: Blood   Result Value Ref Range    Troponin T <0.010 0.000 - 0.030 ng/mL   D-dimer, Quantitative    Collection Time: 12/19/22 10:21 AM    Specimen: Blood   Result Value Ref Range    D-Dimer, Quantitative 3.56 (H) 0.00 - 0.50 MCGFEU/mL   Magnesium    Collection Time: 12/19/22 10:21 AM    Specimen: Blood   Result Value Ref Range    Magnesium 1.9 1.6 - 2.6 mg/dL   TSH    Collection Time: 12/19/22 10:21 AM    Specimen: Blood   Result Value Ref Range    TSH 0.710 0.270 - 4.200 uIU/mL   CBC Auto Differential    Collection Time: 12/19/22 10:21 AM    Specimen: Blood   Result Value Ref Range    WBC 6.94 3.40 - 10.80 10*3/mm3    RBC 3.70 (L) 3.77 - 5.28 10*6/mm3    Hemoglobin 9.2 (L) 12.0 - 15.9 g/dL    Hematocrit 29.9 (L) 34.0 - 46.6 %    MCV 80.8 79.0 - 97.0 fL    MCH 24.9  (L) 26.6 - 33.0 pg    MCHC 30.8 (L) 31.5 - 35.7 g/dL    RDW 15.6 (H) 12.3 - 15.4 %    RDW-SD 45.0 37.0 - 54.0 fl    MPV 9.1 6.0 - 12.0 fL    Platelets 376 140 - 450 10*3/mm3    Neutrophil % 92.4 (H) 42.7 - 76.0 %    Lymphocyte % 1.2 (L) 19.6 - 45.3 %    Monocyte % 2.4 (L) 5.0 - 12.0 %    Eosinophil % 3.0 0.3 - 6.2 %    Basophil % 0.4 0.0 - 1.5 %    Immature Grans % 0.6 (H) 0.0 - 0.5 %    Neutrophils, Absolute 6.41 1.70 - 7.00 10*3/mm3    Lymphocytes, Absolute 0.08 (L) 0.70 - 3.10 10*3/mm3    Monocytes, Absolute 0.17 0.10 - 0.90 10*3/mm3    Eosinophils, Absolute 0.21 0.00 - 0.40 10*3/mm3    Basophils, Absolute 0.03 0.00 - 0.20 10*3/mm3    Immature Grans, Absolute 0.04 0.00 - 0.05 10*3/mm3    nRBC 0.0 0.0 - 0.2 /100 WBC       Radiology  XR Chest 1 View    Result Date: 12/19/2022  XR CHEST 1 VW-  Clinical: Chest pain  COMPARISON: None  FINDINGS: There is a nodular density at the right first rib and which likely represents calcification. I would recommend follow-up PA and lateral view of the chest with apical lordotic view when patient's condition permits. Heart size within normal limits, no mediastinal or hilar abnormality.  No effusion, edema or acute airspace disease identified. The remainder is unremarkable.  This report was finalized on 12/19/2022 11:12 AM by Dr. Donavan Pena M.D.      CT Angiogram Chest    Result Date: 12/19/2022  CT ANGIOGRAM OF THE CHEST. MULTIPLE CORONAL, SAGITTAL, AND 3-D RECONSTRUCTIONS.  HISTORY: 47-year-old female with shortness of breath.  TECHNIQUE: Radiation dose reduction techniques were utilized, including automated exposure control and exposure modulation based on body size. CT angiogram of the chest was performed following the administration of IV contrast. Multiple coronal, sagittal, and 3-D reconstruction images were obtained. There is no previous CT for comparison.  FINDINGS: There is heterogeneous admixture of contrast in some of the pulmonary arteries, but there is no  convincing evidence for pulmonary thromboemboli. There are linear scars and dependent atelectatic changes at both lung bases. There are minimal bilateral pleural effusions. There is also a minimal pericardial effusion. There are enlarged nodes at both axilla with one of the largest on the right measuring 4.0 x 2.5 cm. There are also shotty and mildly enlarged subpectoral nodes bilaterally. There is no lymphadenopathy at the visualized upper abdomen. Bilateral nephrolithiasis is noted at the visualized kidneys.      1. There is no convincing evidence for pulmonary thromboemboli. 2. There are minimal bilateral pleural effusions and pericardial effusion. There is also significant bilateral axillary and subpectoral lymphadenopathy. The lymphadenopathy may be reactive, but is worrisome and is indeterminate at this point. Close follow-up is recommended.  CHECK CHECK        Medical Decision Making:  ED Course as of 12/19/22 1315   Mon Dec 19, 2022   1054 Patient turned over to Dr. Tello pending labs, imaging, and disposition. [AH]   1246 I reviewed work-up and findings with the patient and family at the bedside.  Answered all questions.  Her hemoglobin is stable.  She does have a positive D-dimer but her CT scan shows no evidence of PE or dissection.  She left axillary and pectoral lymphadenopathy of unclear significance.  She also has bilateral pleural effusions and pericardial effusion which are small.  She recently was diagnosed with vasculitis by biopsy in her lower legs and started on Rituxan.  She has had 2 doses.  Given her pain, effusions, and worrisome lymphadenopathy, plan to admit her to the hospital for further work-up and evaluation.  She is agreeable. [TR]   1248 Heart Score Calculation:    History slightly suspicious: 0  History moderately suspicious: +1  History highly suspicious: +2    EKG normal: 0  EKG nonspecific repolarization disturbance: +1  EKG significant ST deviation: +2    Age less than 45:  0  Age 45-64: +1  Age greater than 65: +2    No known risk factors: 0  1-2 risk factors: +1  Greater than 3 risk factors: +2    Initial troponin less than the normal limit: 0  Initial troponin I-III times normal limit: +1  Initial troponin greater than 3 times normal limit: +2    Total score: 2 [TR]   1314 Speaking with Dr. Varma with [facility].  Will admit. [TR]      ED Course User Index  [AH] Kelly Abarca PA  [TR] Crow Tello MD       Plan to check chemistries, blood counts, troponin, EKG, chest x-ray.  We will obtain D-dimer to rule out PE.  She has normal oxygen saturation but she has mildly tachycardic.  Her pain is atypical for cardiac disease.    Procedures:  Procedures      PPE: The patient wore a mask and I wore an N95 mask throughout the entire patient encounter.      The patient has started, but not completed, their COVID-19 vaccination series.    Diagnosis  Final diagnoses:   Pericardial effusion   Pleural effusion   Axillary lymphadenopathy   Atypical chest pain       Note Disclaimer: At [facility], we believe that sharing information builds trust and better relationships. You are receiving this note because you recently visited [facility]. It is possible you will see health information before a provider has talked with you about it. This kind of information can be easy to misunderstand. To help you fully understand what it means for your health, we urge you to discuss this note with your provider.     Crow Tello MD  12/19/22 6766

## 2022-12-19 NOTE — PLAN OF CARE
Problem: Adult Inpatient Plan of Care  Goal: Absence of Hospital-Acquired Illness or Injury  Intervention: Identify and Manage Fall Risk  Recent Flowsheet Documentation  Taken 12/19/2022 1800 by Yohana Snowden RN  Safety Promotion/Fall Prevention: safety round/check completed  Taken 12/19/2022 1600 by Yohana Snowden RN  Safety Promotion/Fall Prevention: safety round/check completed  Taken 12/19/2022 1423 by Yohana Snowden RN  Safety Promotion/Fall Prevention: safety round/check completed  Intervention: Prevent Skin Injury  Recent Flowsheet Documentation  Taken 12/19/2022 1423 by Yohana Snowden RN  Body Position: sitting up in bed  Intervention: Prevent and Manage VTE (Venous Thromboembolism) Risk  Recent Flowsheet Documentation  Taken 12/19/2022 1423 by Yohana Snowden RN  Activity Management: activity adjusted per tolerance  Goal: Optimal Comfort and Wellbeing  Intervention: Provide Person-Centered Care  Recent Flowsheet Documentation  Taken 12/19/2022 1423 by Yohana Snowden RN  Trust Relationship/Rapport: care explained  Goal: Readiness for Transition of Care  Intervention: Mutually Develop Transition Plan  Recent Flowsheet Documentation  Taken 12/19/2022 1434 by Yohana Snowden RN  Transportation Anticipated: family or friend will provide  Patient/Family Anticipated Services at Transition: none  Patient/Family Anticipates Transition to: home  Taken 12/19/2022 1433 by Yohana Snowden RN  Equipment Currently Used at Home: cpap   Goal Outcome Evaluation:

## 2022-12-19 NOTE — ED PROVIDER NOTES
EMERGENCY DEPARTMENT ENCOUNTER    Room Number:  103/1  Date seen:  12/19/2022  Time seen: 10:32 EST  PCP: Lynn De Paz APRN  Historian: patient      HPI:  Chief Complaint: heart racing/chest pain    Context: Lauren Chicas is a 47 y.o. female who presents to the ED for evaluation of heart racing and chest pain.  Patient states on Friday of last week she had some intermittent heart racing.  She states on Saturday she had about 5 hours of chest pain described as tightness when taking a deep breath.  She states she did have some slight nausea and tingling of her left arm with this as well.  She denies any symptoms like this in the past.  She has no history of hypertension, hyperlipidemia, diabetes, or smoking.  She has a strong family history of heart disease on her father side, her uncle passed away in his late 40s due to heart attack.  She denies any history of arrhythmias or MIs in the past.  She does have history of RA and has recently started new infusions for that.  She has also recently been diagnosed with vasculitis.  She denies any history of DVT or PEs.  She denies any recent long travel, hormone use, or surgery.  She is not currently on any blood thinning medications.  She currently has no chest pain or shortness of breath just feels slightly anxious with being in the ER.  She denies any recent cough, fever, chills.      PAST MEDICAL HISTORY  Active Ambulatory Problems     Diagnosis Date Noted   • Rheumatoid arthritis (HCC) 11/04/2019   • Migraines 11/04/2019   • History of seizure 11/04/2019   • MICHAEL (obstructive sleep apnea) 11/04/2019   • Prediabetes 11/04/2019   • Class 2 severe obesity with serious comorbidity in adult (HCC) 02/20/2020   • Iron deficiency anemia 02/20/2020   • Chest discomfort 12/21/2012   • Palpitations 12/21/2012   • Family history of colonic polyps 07/25/2022     Resolved Ambulatory Problems     Diagnosis Date Noted   • No Resolved Ambulatory Problems     Past Medical History:    Diagnosis Date   • Anemia    • Vasculitis (HCC)          PAST SURGICAL HISTORY  Past Surgical History:   Procedure Laterality Date   • BIOPSY OF LEG     • COLONOSCOPY N/A 10/14/2022    Procedure: COLONOSCOPY into cecum and normal TI;  Surgeon: Jerson Trejo MD;  Location: [facility] ENDOSCOPY;  Service: Gastroenterology;  Laterality: N/A;  pre: family hx of colon polyps   post: diverticulosis,  hemorrhoids   • LASIK           FAMILY HISTORY  Family History   Problem Relation Age of Onset   • Hypertension Mother    • Thyroid nodules Mother    • Hypertension Father    • Hypothyroidism Sister    • Diabetes Maternal Grandmother    • COPD Maternal Grandfather    • Alcohol abuse Maternal Grandfather    • Alzheimer's disease Paternal Grandmother    • COPD Paternal Grandfather    • Heart disease Paternal Uncle    • Heart disease Paternal Aunt    • No Known Problems Daughter    • No Known Problems Son          SOCIAL HISTORY  Social History     Socioeconomic History   • Marital status:    • Number of children: 2   Tobacco Use   • Smoking status: Never   • Smokeless tobacco: Never   Vaping Use   • Vaping Use: Never used   Substance and Sexual Activity   • Alcohol use: No   • Drug use: No   • Sexual activity: Yes     Partners: Male     Birth control/protection: Vasectomy         ALLERGIES  Sulfa antibiotics        REVIEW OF SYSTEMS  Review of Systems   Constitutional: Negative for chills and fever.   HENT: Negative for nosebleeds.    Respiratory: Positive for shortness of breath. Negative for cough.    Cardiovascular: Positive for chest pain and palpitations. Negative for leg swelling.   Gastrointestinal: Negative for abdominal pain, blood in stool, nausea and vomiting.   Genitourinary: Negative for dysuria and flank pain.   Musculoskeletal: Negative for back pain.   Skin: Negative for color change and pallor.   Neurological: Negative for speech difficulty, numbness and headaches.   Psychiatric/Behavioral: Negative  for confusion.      All systems reviewed and negative except for those discussed in HPI.       PHYSICAL EXAM  ED Triage Vitals [12/19/22 0955]   Temp Heart Rate Resp BP SpO2   99.4 °F (37.4 °C) 115 16 -- 98 %      Temp src Heart Rate Source Patient Position BP Location FiO2 (%)   Tympanic Monitor -- -- --         GENERAL: not distressed  HENT: atraumatic  EYES: no scleral icterus  CV: regular rhythm, regular rate.  No murmurs, rubs, or gallops  RESPIRATORY: normal effort.  No wheezes, rales, rhonchi.  No reproducible chest wall tenderness.  ABDOMEN: soft, nontender  MUSCULOSKELETAL: no deformity.  No pitting edema or tenderness of bilateral lower extremities.  No palpable knots or cords.  NEURO: alert, moves all extremities, follows commands  SKIN: warm, dry    Vital signs and nursing notes reviewed.          LAB RESULTS  Recent Results (from the past 24 hour(s))   ECG 12 Lead Chest Pain    Collection Time: 12/19/22 10:00 AM   Result Value Ref Range    QT Interval 339 ms   Green Top (Gel)    Collection Time: 12/19/22 10:21 AM   Result Value Ref Range    Extra Tube Hold for add-ons.    Lavender Top    Collection Time: 12/19/22 10:21 AM   Result Value Ref Range    Extra Tube hold for add-on    Gold Top - SST    Collection Time: 12/19/22 10:21 AM   Result Value Ref Range    Extra Tube Hold for add-ons.    Light Blue Top    Collection Time: 12/19/22 10:21 AM   Result Value Ref Range    Extra Tube Hold for add-ons.    Comprehensive Metabolic Panel    Collection Time: 12/19/22 10:21 AM    Specimen: Blood   Result Value Ref Range    Glucose 104 (H) 65 - 99 mg/dL    BUN 12 6 - 20 mg/dL    Creatinine 0.62 0.57 - 1.00 mg/dL    Sodium 140 136 - 145 mmol/L    Potassium 3.8 3.5 - 5.2 mmol/L    Chloride 106 98 - 107 mmol/L    CO2 22.1 22.0 - 29.0 mmol/L    Calcium 8.7 8.6 - 10.5 mg/dL    Total Protein 7.5 6.0 - 8.5 g/dL    Albumin 3.70 3.50 - 5.20 g/dL    ALT (SGPT) 13 1 - 33 U/L    AST (SGOT) 16 1 - 32 U/L    Alkaline  Phosphatase 33 (L) 39 - 117 U/L    Total Bilirubin 0.6 0.0 - 1.2 mg/dL    Globulin 3.8 gm/dL    A/G Ratio 1.0 g/dL    BUN/Creatinine Ratio 19.4 7.0 - 25.0    Anion Gap 11.9 5.0 - 15.0 mmol/L    eGFR 110.0 >60.0 mL/min/1.73   hCG, Serum, Qualitative    Collection Time: 12/19/22 10:21 AM    Specimen: Blood   Result Value Ref Range    HCG Qualitative Negative Negative   BNP    Collection Time: 12/19/22 10:21 AM    Specimen: Blood   Result Value Ref Range    proBNP 524.0 (H) 0.0 - 450.0 pg/mL   Troponin    Collection Time: 12/19/22 10:21 AM    Specimen: Blood   Result Value Ref Range    Troponin T <0.010 0.000 - 0.030 ng/mL   D-dimer, Quantitative    Collection Time: 12/19/22 10:21 AM    Specimen: Blood   Result Value Ref Range    D-Dimer, Quantitative 3.56 (H) 0.00 - 0.50 MCGFEU/mL   Magnesium    Collection Time: 12/19/22 10:21 AM    Specimen: Blood   Result Value Ref Range    Magnesium 1.9 1.6 - 2.6 mg/dL   TSH    Collection Time: 12/19/22 10:21 AM    Specimen: Blood   Result Value Ref Range    TSH 0.710 0.270 - 4.200 uIU/mL   CBC Auto Differential    Collection Time: 12/19/22 10:21 AM    Specimen: Blood   Result Value Ref Range    WBC 6.94 3.40 - 10.80 10*3/mm3    RBC 3.70 (L) 3.77 - 5.28 10*6/mm3    Hemoglobin 9.2 (L) 12.0 - 15.9 g/dL    Hematocrit 29.9 (L) 34.0 - 46.6 %    MCV 80.8 79.0 - 97.0 fL    MCH 24.9 (L) 26.6 - 33.0 pg    MCHC 30.8 (L) 31.5 - 35.7 g/dL    RDW 15.6 (H) 12.3 - 15.4 %    RDW-SD 45.0 37.0 - 54.0 fl    MPV 9.1 6.0 - 12.0 fL    Platelets 376 140 - 450 10*3/mm3    Neutrophil % 92.4 (H) 42.7 - 76.0 %    Lymphocyte % 1.2 (L) 19.6 - 45.3 %    Monocyte % 2.4 (L) 5.0 - 12.0 %    Eosinophil % 3.0 0.3 - 6.2 %    Basophil % 0.4 0.0 - 1.5 %    Immature Grans % 0.6 (H) 0.0 - 0.5 %    Neutrophils, Absolute 6.41 1.70 - 7.00 10*3/mm3    Lymphocytes, Absolute 0.08 (L) 0.70 - 3.10 10*3/mm3    Monocytes, Absolute 0.17 0.10 - 0.90 10*3/mm3    Eosinophils, Absolute 0.21 0.00 - 0.40 10*3/mm3    Basophils, Absolute  0.03 0.00 - 0.20 10*3/mm3    Immature Grans, Absolute 0.04 0.00 - 0.05 10*3/mm3    nRBC 0.0 0.0 - 0.2 /100 WBC       Ordered the above labs and independently reviewed the results.        RADIOLOGY  XR Chest 1 View    Result Date: 12/19/2022  Narrative: XR CHEST 1 VW-  Clinical: Chest pain  COMPARISON: None  FINDINGS: There is a nodular density at the right first rib and which likely represents calcification. I would recommend follow-up PA and lateral view of the chest with apical lordotic view when patient's condition permits. Heart size within normal limits, no mediastinal or hilar abnormality.  No effusion, edema or acute airspace disease identified. The remainder is unremarkable.  This report was finalized on 12/19/2022 11:12 AM by Dr. Donavan Pena M.D.      CT Angiogram Chest    Result Date: 12/19/2022  Narrative: CT ANGIOGRAM OF THE CHEST. MULTIPLE CORONAL, SAGITTAL, AND 3-D RECONSTRUCTIONS.  HISTORY: 47-year-old female with shortness of breath.  TECHNIQUE: Radiation dose reduction techniques were utilized, including automated exposure control and exposure modulation based on body size. CT angiogram of the chest was performed following the administration of IV contrast. Multiple coronal, sagittal, and 3-D reconstruction images were obtained. There is no previous CT for comparison.  FINDINGS: There is heterogeneous admixture of contrast in some of the pulmonary arteries, but there is no convincing evidence for pulmonary thromboemboli. There are linear scars and dependent atelectatic changes at both lung bases. There are minimal bilateral pleural effusions. There is also a minimal pericardial effusion. There are enlarged nodes at both axilla with one of the largest on the right measuring 4.0 x 2.5 cm. There are also shotty and mildly enlarged subpectoral nodes bilaterally. There is no lymphadenopathy at the visualized upper abdomen. Bilateral nephrolithiasis is noted at the visualized kidneys.      Impression:  1. There is no convincing evidence for pulmonary thromboemboli. 2. There are minimal bilateral pleural effusions and pericardial effusion. There is also significant bilateral axillary and subpectoral lymphadenopathy. The lymphadenopathy may be reactive, but is worrisome and is indeterminate at this point. Close follow-up is recommended.  CHECK CHECK        I ordered the above noted radiological studies. Reviewed by me and discussed with radiologist.  See dictation for official radiology interpretation.    PROCEDURES  Procedures        MEDICATIONS GIVEN IN ER  Medications   sodium chloride 0.9 % flush 10 mL (has no administration in time range)   sodium chloride 0.9 % flush 10 mL (has no administration in time range)   sodium chloride 0.9 % flush 10 mL (has no administration in time range)   sodium chloride 0.9 % infusion 40 mL (has no administration in time range)   nitroglycerin (NITROSTAT) SL tablet 0.4 mg (has no administration in time range)   acetaminophen (TYLENOL) tablet 650 mg (has no administration in time range)     Or   acetaminophen (TYLENOL) 160 MG/5ML solution 650 mg (has no administration in time range)     Or   acetaminophen (TYLENOL) suppository 650 mg (has no administration in time range)   ondansetron (ZOFRAN) injection 4 mg (has no administration in time range)   ketorolac (TORADOL) injection 30 mg (has no administration in time range)   famotidine (PEPCID) injection 20 mg (has no administration in time range)   iopamidol (ISOVUE-370) 76 % injection 100 mL (95 mL Intravenous Given by Other 12/19/22 1150)       MEDICAL RECORD REVIEW  I reviewed the patient's records      PROGRESS, DATA ANALYSIS, CONSULTS, AND MEDICAL DECISION MAKING    All labs have been independently reviewed by me.  All radiology studies have been reviewed by me. Discussion below represents my analysis of pertinent findings related to patient's condition, differential diagnosis, treatment plan and final disposition.    DDX  includes but not limited to MI, arrhythmia, PE, pleurisy, lung mass, bronchitis.      ED Course as of 12/19/22 1356   Mon Dec 19, 2022   1054 Patient turned over to Dr. Tello pending labs, imaging, and disposition. [AH]   1246 I reviewed work-up and findings with the patient and family at the bedside.  Answered all questions.  Her hemoglobin is stable.  She does have a positive D-dimer but her CT scan shows no evidence of PE or dissection.  She left axillary and pectoral lymphadenopathy of unclear significance.  She also has bilateral pleural effusions and pericardial effusion which are small.  She recently was diagnosed with vasculitis by biopsy in her lower legs and started on Rituxan.  She has had 2 doses.  Given her pain, effusions, and worrisome lymphadenopathy, plan to admit her to the hospital for further work-up and evaluation.  She is agreeable. [TR]   1248 Heart Score Calculation:    History slightly suspicious: 0  History moderately suspicious: +1  History highly suspicious: +2    EKG normal: 0  EKG nonspecific repolarization disturbance: +1  EKG significant ST deviation: +2    Age less than 45: 0  Age 45-64: +1  Age greater than 65: +2    No known risk factors: 0  1-2 risk factors: +1  Greater than 3 risk factors: +2    Initial troponin less than the normal limit: 0  Initial troponin I-III times normal limit: +1  Initial troponin greater than 3 times normal limit: +2    Total score: 2 [TR]   1314 Speaking with Dr. Varma with [facility].  Will admit. [TR]      ED Course User Index  [AH] Kelly Abarca PA  [TR] Crow Tello MD           Reviewed pt's history and workup with Dr. Tello.  After a bedside evaluation; they agree with the plan of care      Patient's disposition is admission.    Patient was placed in face mask in first look. Patient was wearing facemask each time I entered the room and throughout our encounter. I wore full protective equipment throughout this patient encounter including a face  mask, eye shield, gown and gloves. Hand hygiene was performed before donning protective equipment and after removal when leaving the room.        DIAGNOSIS  Final diagnoses:   Pericardial effusion   Pleural effusion   Axillary lymphadenopathy   Atypical chest pain           Latest Documented Vital Signs:  As of 13:56 EST  BP- 164/93 HR- 92 Temp- 99.4 °F (37.4 °C) (Tympanic) O2 sat- 99%         Kelly Abarca PA  12/19/22 2521

## 2022-12-19 NOTE — H&P
Internal medicine history and physical  INTERNAL MEDICINE   [facility]       Patient Identification:  Name: Lauren Chicas  Age: 47 y.o.  Sex: female  :  1975  MRN: [id number]                   Primary Care Physician: Lynn De Paz APRN                               Date of admission:2022    Chief Complaint: Palpitations while working on Friday evening followed by chest pain shortness of breath nausea and vomiting since Saturday morning with numbness and tingling last night.    History of Present Illness:   Patient is a 47-year-old female who works at [employer] has complicated past medical history due to rheumatoid arthritis for long period time and has been on various disease modifying agents including Enbrel until recently when she was switched to Rituxan after a diagnosis of vasculitis following a biopsy of the lesion on the right leg. She has taken 2 doses of Rituxan since.  According to her she was feeling fine when she went to work Friday and later in the Friday evening she noticed that her heart was racing and she was having palpitations.  She initially attributed to the stress of work but when she woke up next day she was having chest discomfort along with shortness of breath and nausea and vomiting.  Last night she could not sleep well because of the numbness and tingling of the left arm.  She communicated with her rheumatologist about her condition and was referred to the emergency room for further evaluation.  Patient denies any fever chills or decrease in appetite.  He is feeling somewhat better in the emergency room.  Work-up in the emergency room included CT scan of the chest PE protocol given the fact that she has elevated D-dimer which revealed no evidence of pulmonary embolism but did show  mild bilateral pleural effusion as well as pericardial effusion and bilateral axillary and subpectoral adenopathy.  Her initial troponin is negative.      Past Medical History:  Past  Medical History:   Diagnosis Date   • Anemia    • Rheumatoid arthritis (HCC)    • Vasculitis (HCC)      Past Surgical History:  Past Surgical History:   Procedure Laterality Date   • BIOPSY OF LEG     • COLONOSCOPY N/A 10/14/2022    Procedure: COLONOSCOPY into cecum and normal TI;  Surgeon: Jerson Trejo MD;  Location: [facility] ENDOSCOPY;  Service: Gastroenterology;  Laterality: N/A;  pre: family hx of colon polyps   post: diverticulosis,  hemorrhoids   • LASIK        Home Meds:  Medications Prior to Admission   Medication Sig Dispense Refill Last Dose   • BIOTIN PO Take  by mouth.      • calcium citrate-vitamin d (CITRACAL) 200-250 MG-UNIT tablet tablet Take  by mouth Daily.      • dextromethorphan-guaifenesin (MUCINEX DM)  MG per 12 hr tablet Take 1 tablet by mouth Every 12 (Twelve) Hours.      • Ferrous Sulfate (IRON PO) Take 1 tablet by mouth Daily.      • FEVERFEW PO Take  by mouth.      • Fexofenadine HCl (MUCINEX ALLERGY PO) Take  by mouth.      • folic acid (FOLVITE) 1 MG tablet       • Methotrexate Sodium 50 MG/2ML injection       • Multiple Vitamins-Minerals (MULTIVITAMIN PO) Take  by mouth.      • Omega-3 Fatty Acids (FISH OIL PO) Take  by mouth.      • predniSONE (DELTASONE) 5 MG tablet       • ENBREL SURECLICK 50 MG/ML solution auto-injector       • riTUXimab (RITUXAN) 10 MG/ML solution injection Infuse  into a venous catheter 1 (One) Time.      • Xiidra 5 % ophthalmic solution         Current Meds:     Current Facility-Administered Medications:   •  [COMPLETED] Insert Peripheral IV, , , Once **AND** sodium chloride 0.9 % flush 10 mL, 10 mL, Intravenous, PRN, Kelly Abarca PA  Allergies:  Allergies   Allergen Reactions   • Sulfa Antibiotics Swelling and Anaphylaxis     Swelling of her lips     Social History:   Social History     Tobacco Use   • Smoking status: Never   • Smokeless tobacco: Never   Substance Use Topics   • Alcohol use: No      Family History:  Family History   Problem  Relation Age of Onset   • Hypertension Mother    • Thyroid nodules Mother    • Hypertension Father    • Hypothyroidism Sister    • Diabetes Maternal Grandmother    • COPD Maternal Grandfather    • Alcohol abuse Maternal Grandfather    • Alzheimer's disease Paternal Grandmother    • COPD Paternal Grandfather    • Heart disease Paternal Uncle    • Heart disease Paternal Aunt    • No Known Problems Daughter    • No Known Problems Son           Review of Systems  See history of present illness and past medical history.    Constitutional: Negative for chills and fever.   HENT: Negative for nosebleeds.    Respiratory: Positive for shortness of breath. Negative for cough.    Cardiovascular: Positive for chest pain and palpitations. Negative for leg swelling.   Gastrointestinal: Negative for abdominal pain, blood in stool, nausea and vomiting.   Genitourinary: Negative for dysuria and flank pain.   Musculoskeletal: Negative for back pain.   Skin: Negative for color change and pallor.   Neurological: Negative for speech difficulty, numbness and headaches.   Psychiatric/Behavioral: Negative for confusion.       Vitals:   /93   Pulse 92   Temp 99.4 °F (37.4 °C) (Tympanic)   Resp 16   LMP 11/21/2022 (Approximate)   SpO2 99%   I/O: No intake or output data in the 24 hours ending 12/19/22 1348  Exam:  Patient is examined using the personal protective equipment as per guidelines from infection control for this particular patient as enacted.  Hand washing was performed before and after patient interaction.  General Appearance:   Alert cooperative nontoxic-appearing female does not appear to be in any acute distress   Head:    Normocephalic, without obvious abnormality, atraumatic   Eyes:    PERRL, conjunctiva/corneas clear, EOM's intact, both eyes   Ears:    Normal external ear canals, both ears   Nose:   Nares normal, septum midline, mucosa normal, no drainage    or sinus tenderness   Throat:   Lips, tongue, gums  normal; oral mucosa pink and moist   Neck:  Neck supple no cervical adenopathy noted   Back:     Symmetric, no curvature, ROM normal, no CVA tenderness   Lungs:    Bilateral air entry clear to auscultation no obvious use of accessory muscles of breathing noted   Chest Wall:    No tenderness or deformity    Heart:   S1-S2 regular   Abdomen:    Soft nontender   Extremities:   Extremities normal, atraumatic, no cyanosis or edema   Pulses:   Pulses palpable in all extremities; symmetric all extremities   Skin:   Skin color normal, Skin is warm and dry,  no rashes or palpable lesions   Neurologic:   CNII-XII intact, motor strength grossly intact, sensation grossly intact to light touch, no focal deficits noted       Data Review:      I reviewed the patient's new clinical results.  Results from last 7 days   Lab Units 12/19/22  1021   WBC 10*3/mm3 6.94   HEMOGLOBIN g/dL 9.2*   PLATELETS 10*3/mm3 376     Results from last 7 days   Lab Units 12/19/22  1021   SODIUM mmol/L 140   POTASSIUM mmol/L 3.8   CHLORIDE mmol/L 106   CO2 mmol/L 22.1   BUN mg/dL 12   CREATININE mg/dL 0.62   CALCIUM mg/dL 8.7   GLUCOSE mg/dL 104*     CT Angiogram Chest    Result Date: 12/19/2022  1. There is no convincing evidence for pulmonary thromboemboli. 2. There are minimal bilateral pleural effusions and pericardial effusion. There is also significant bilateral axillary and subpectoral lymphadenopathy. The lymphadenopathy may be reactive, but is worrisome and is indeterminate at this point. Close follow-up is recommended.  CHECK CHECK      Microbiology Results (last 10 days)     ** No results found for the last 240 hours. **        ECG 12 Lead Chest Pain   Final Result   HEART RATE= 95  bpm   RR Interval= 632  ms   MN Interval= 149  ms   P Horizontal Axis= -17  deg   P Front Axis= 78  deg   QRSD Interval= 70  ms   QT Interval= 339  ms   QRS Axis= 19  deg   T Wave Axis= 52  deg   - ABNORMAL ECG -   Sinus rhythm   No Prior Tracing for Comparison    Consider pericarditis   Electronically Signed By: [facility] 19-Dec-2022 17:49:22   Date and Time of Study: 2022-12-19 10:00:32          Assessment:  Active Hospital Problems    Diagnosis  POA   • **Pleuropericarditis [I31.9]  Yes   • Pericardial effusion [I31.39]  Yes   • Vasculitis (HCC) [I77.6]  Yes   • Immunocompromised state (HCC) [D84.9]  Unknown   • Lymphadenopathy, axillary [R59.0]  Yes   • Anemia [D64.9]  Unknown   • Migraines [G43.909]  Yes   • MICHAEL (obstructive sleep apnea) [G47.33]  Yes   • Rheumatoid arthritis (HCC) [M06.9]  Yes   • Palpitations [R00.2]  Yes       Medical decision making/care plan: See admitting orders  Manage her chest pain and pericardial pain with IV Toradol on as-needed basis  Get 2D echo with Doppler  Hematology oncology consultation for further work-up regarding lymphadenopathy seen in the subpectoral and axillary area  Continue her home regimen  Continuous pulse ox monitoring  Monitor her renal function while receiving Toradol  Provide with IV Pepcid while receiving Toradol  Continue her iron replacement therapy for anemia  Further management as her condition evolves.    Peri Varma MD   12/19/2022  13:48 EST    Parts of this note may be an electronic transcription/translation of spoken language to printed text using the Dragon dictation system.

## 2022-12-19 NOTE — ED TRIAGE NOTES
Pt has had cp, soa, dizziness and arm pain this weekend.  Symptoms have resolved this am    Patient was placed in face mask during first look triage.  Patient was wearing a face mask throughout encounter.  I wore personal protective equipment throughout the encounter.  Hand hygiene was performed before and after patient encounter.

## 2022-12-20 ENCOUNTER — READMISSION MANAGEMENT (OUTPATIENT)
Dept: CALL CENTER | Facility: HOSPITAL | Age: 47
End: 2022-12-20

## 2022-12-20 VITALS
TEMPERATURE: 98.2 F | WEIGHT: 213.85 LBS | RESPIRATION RATE: 18 BRPM | DIASTOLIC BLOOD PRESSURE: 73 MMHG | SYSTOLIC BLOOD PRESSURE: 120 MMHG | HEIGHT: 63 IN | OXYGEN SATURATION: 99 % | BODY MASS INDEX: 37.89 KG/M2 | HEART RATE: 89 BPM

## 2022-12-20 DIAGNOSIS — D64.9 ANEMIA, UNSPECIFIED TYPE: ICD-10-CM

## 2022-12-20 DIAGNOSIS — R59.0 AXILLARY LYMPHADENOPATHY: Primary | ICD-10-CM

## 2022-12-20 DIAGNOSIS — D50.9 IRON DEFICIENCY ANEMIA, UNSPECIFIED IRON DEFICIENCY ANEMIA TYPE: ICD-10-CM

## 2022-12-20 DIAGNOSIS — R93.89 ABNORMAL CT OF THE CHEST: ICD-10-CM

## 2022-12-20 LAB
ALBUMIN SERPL-MCNC: 3.1 G/DL (ref 3.5–5.2)
ALBUMIN/GLOB SERPL: 0.8 G/DL
ALP SERPL-CCNC: 31 U/L (ref 39–117)
ALT SERPL W P-5'-P-CCNC: 18 U/L (ref 1–33)
ANION GAP SERPL CALCULATED.3IONS-SCNC: 9.7 MMOL/L (ref 5–15)
AST SERPL-CCNC: 15 U/L (ref 1–32)
BASOPHILS # BLD MANUAL: 0.04 10*3/MM3 (ref 0–0.2)
BASOPHILS NFR BLD MANUAL: 1 % (ref 0–1.5)
BILIRUB SERPL-MCNC: 0.4 MG/DL (ref 0–1.2)
BUN SERPL-MCNC: 15 MG/DL (ref 6–20)
BUN/CREAT SERPL: 20.8 (ref 7–25)
CALCIUM SPEC-SCNC: 8.5 MG/DL (ref 8.6–10.5)
CHLORIDE SERPL-SCNC: 103 MMOL/L (ref 98–107)
CO2 SERPL-SCNC: 23.3 MMOL/L (ref 22–29)
CREAT SERPL-MCNC: 0.72 MG/DL (ref 0.57–1)
DEPRECATED RDW RBC AUTO: 44.7 FL (ref 37–54)
EGFRCR SERPLBLD CKD-EPI 2021: 103.3 ML/MIN/1.73
EOSINOPHIL # BLD MANUAL: 0.17 10*3/MM3 (ref 0–0.4)
EOSINOPHIL NFR BLD MANUAL: 4.1 % (ref 0.3–6.2)
ERYTHROCYTE [DISTWIDTH] IN BLOOD BY AUTOMATED COUNT: 15.7 % (ref 12.3–15.4)
GLOBULIN UR ELPH-MCNC: 3.9 GM/DL
GLUCOSE SERPL-MCNC: 122 MG/DL (ref 65–99)
HCT VFR BLD AUTO: 27.6 % (ref 34–46.6)
HGB BLD-MCNC: 8.4 G/DL (ref 12–15.9)
LYMPHOCYTES # BLD MANUAL: 0.3 10*3/MM3 (ref 0.7–3.1)
LYMPHOCYTES NFR BLD MANUAL: 2 % (ref 5–12)
MCH RBC QN AUTO: 24.3 PG (ref 26.6–33)
MCHC RBC AUTO-ENTMCNC: 30.4 G/DL (ref 31.5–35.7)
MCV RBC AUTO: 80 FL (ref 79–97)
MONOCYTES # BLD: 0.08 10*3/MM3 (ref 0.1–0.9)
NEUTROPHILS # BLD AUTO: 3.58 10*3/MM3 (ref 1.7–7)
NEUTROPHILS NFR BLD MANUAL: 85.7 % (ref 42.7–76)
PLAT MORPH BLD: NORMAL
PLATELET # BLD AUTO: 352 10*3/MM3 (ref 140–450)
PMV BLD AUTO: 9.4 FL (ref 6–12)
POIKILOCYTOSIS BLD QL SMEAR: ABNORMAL
POTASSIUM SERPL-SCNC: 3.3 MMOL/L (ref 3.5–5.2)
PROT SERPL-MCNC: 7 G/DL (ref 6–8.5)
RBC # BLD AUTO: 3.45 10*6/MM3 (ref 3.77–5.28)
SODIUM SERPL-SCNC: 136 MMOL/L (ref 136–145)
VARIANT LYMPHS NFR BLD MANUAL: 7.1 % (ref 19.6–45.3)
WBC MORPH BLD: NORMAL
WBC NRBC COR # BLD: 4.18 10*3/MM3 (ref 3.4–10.8)

## 2022-12-20 PROCEDURE — 85025 COMPLETE CBC W/AUTO DIFF WBC: CPT | Performed by: INTERNAL MEDICINE

## 2022-12-20 PROCEDURE — 96376 TX/PRO/DX INJ SAME DRUG ADON: CPT

## 2022-12-20 PROCEDURE — 85007 BL SMEAR W/DIFF WBC COUNT: CPT | Performed by: INTERNAL MEDICINE

## 2022-12-20 PROCEDURE — G0378 HOSPITAL OBSERVATION PER HR: HCPCS

## 2022-12-20 PROCEDURE — 80053 COMPREHEN METABOLIC PANEL: CPT | Performed by: INTERNAL MEDICINE

## 2022-12-20 PROCEDURE — 63710000001 PREDNISONE PER 5 MG: Performed by: INTERNAL MEDICINE

## 2022-12-20 PROCEDURE — 99204 OFFICE O/P NEW MOD 45 MIN: CPT | Performed by: INTERNAL MEDICINE

## 2022-12-20 RX ORDER — IBUPROFEN 400 MG/1
400 TABLET ORAL EVERY 4 HOURS PRN
Status: DISCONTINUED | OUTPATIENT
Start: 2022-12-20 | End: 2022-12-20 | Stop reason: HOSPADM

## 2022-12-20 RX ORDER — CETIRIZINE HYDROCHLORIDE 10 MG/1
10 TABLET ORAL DAILY
Status: DISCONTINUED | OUTPATIENT
Start: 2022-12-20 | End: 2022-12-20 | Stop reason: HOSPADM

## 2022-12-20 RX ADMIN — FERROUS SULFATE TAB 325 MG (65 MG ELEMENTAL FE) 325 MG: 325 (65 FE) TAB at 08:05

## 2022-12-20 RX ADMIN — PREDNISONE 5 MG: 10 TABLET ORAL at 08:05

## 2022-12-20 RX ADMIN — Medication 10 ML: at 08:08

## 2022-12-20 RX ADMIN — FOLIC ACID 1 MG: 1 TABLET ORAL at 08:05

## 2022-12-20 RX ADMIN — FAMOTIDINE 20 MG: 10 INJECTION INTRAVENOUS at 06:31

## 2022-12-20 RX ADMIN — Medication 1 TABLET: at 08:05

## 2022-12-20 RX ADMIN — IBUPROFEN 400 MG: 400 TABLET, FILM COATED ORAL at 02:28

## 2022-12-20 RX ADMIN — GUAIFENESIN AND DEXTROMETHORPHAN HYDROBROMIDE 1 TABLET: 600; 30 TABLET, EXTENDED RELEASE ORAL at 06:31

## 2022-12-20 RX ADMIN — CALCIUM CARBONATE-VITAMIN D TAB 500 MG-200 UNIT 1 TABLET: 500-200 TAB at 08:05

## 2022-12-20 RX ADMIN — HYDROXYCHLOROQUINE SULFATE 200 MG: 200 TABLET, FILM COATED ORAL at 08:05

## 2022-12-20 RX ADMIN — IBUPROFEN 400 MG: 400 TABLET, FILM COATED ORAL at 08:05

## 2022-12-20 NOTE — DISCHARGE SUMMARY
Patient Name: Lauren Chicas  : 1975  MRN: [id number]    Date of Admission: 2022  Date of Discharge:  2022  Primary Care Physician: Lynn De Paz APRN      Chief Complaint:   Chest Pain      Discharge Diagnoses     Active Hospital Problems    Diagnosis  POA   • **Pleuropericarditis [I31.9]  Yes   • Pericardial effusion [I31.39]  Yes   • Vasculitis (HCC) [I77.6]  Yes   • Immunocompromised state (HCC) [D84.9]  Unknown   • Lymphadenopathy, axillary [R59.0]  Yes   • Anemia [D64.9]  Unknown   • Migraines [G43.909]  Yes   • MICHAEL (obstructive sleep apnea) [G47.33]  Yes   • Rheumatoid arthritis (HCC) [M06.9]  Yes   • Palpitations [R00.2]  Yes      Resolved Hospital Problems   No resolved problems to display.        Hospital Course     This is a 47-year-old woman with a past medical history of rheumatoid arthritis, as well as vasculitis who comes to the hospital after experiencing palpitations, chest discomfort, shortness of air along with nausea and vomiting 2 to 3 days prior to presentation.  She presented the ER for further evaluation and management.  Her D-dimer was elevated, CT angiogram of the chest was done that did not show any pulmonary embolism, but did show some mild bilateral pleural effusions as well as a mild pericardial effusion.  Bilateral axillary and subpectoral adenopathy was also noted.  An echocardiogram was done that showed an ejection fraction of 63% along with grade 1 diastolic dysfunction, did not reveal any evidence of pericardial effusion.  Oncology was consulted for evaluation of the lymphadenopathy.  She will have a PET scan scheduled for the outpatient setting and will follow up with hematology oncology after discharge.  Patient is eager for discharge home, and is able to do so.    Day of Discharge     Physical Exam:  Temp:  [98.2 °F (36.8 °C)-98.8 °F (37.1 °C)] 98.2 °F (36.8 °C)  Heart Rate:  [85-99] 89  Resp:  [15-18] 18  BP: (120-141)/(69-77) 120/73  Body mass index is  37.89 kg/m².  Physical Exam    General: Alert and oriented x3, no acute distress  HEENT: Normocephalic, atraumatic  CV: Regular rate and rhythm, no murmurs rubs or gallops  Lungs: Clear to auscultation bilaterally, no crackles or wheezes  Abdomen: Soft, nontender, nondistended  Neuro: CN II-XII intact, no FND appreciated     Consultants     Consult Orders (all) (From admission, onward)     Start     Ordered    12/19/22 1351  Inpatient Hematology & Oncology Consult  Once        Specialty:  Hematology and Oncology  Provider:  Fausto Pradhan MD PhD    12/19/22 1353    12/19/22 1248  [facility] (on-call MD unless specified) Details  Once        Specialty:  Hospitalist  Provider:  Peri Varma MD    12/19/22 1247              Procedures     Imaging Results (All)     Procedure Component Value Units Date/Time    CT Angiogram Chest [[id number]] Collected: 12/19/22 1216     Updated: 12/19/22 1216    Narrative:      CT ANGIOGRAM OF THE CHEST. MULTIPLE CORONAL, SAGITTAL, AND 3-D  RECONSTRUCTIONS.     HISTORY: 47-year-old female with shortness of breath.     TECHNIQUE: Radiation dose reduction techniques were utilized, including  automated exposure control and exposure modulation based on body size.   CT angiogram of the chest was performed following the administration of  IV contrast. Multiple coronal, sagittal, and 3-D reconstruction images  were obtained. There is no previous CT for comparison.     FINDINGS: There is heterogeneous admixture of contrast in some of the  pulmonary arteries, but there is no convincing evidence for pulmonary  thromboemboli. There are linear scars and dependent atelectatic changes  at both lung bases. There are minimal bilateral pleural effusions. There  is also a minimal pericardial effusion. There are enlarged nodes at both  axilla with one of the largest on the right measuring 4.0 x 2.5 cm.  There are also shotty and mildly enlarged subpectoral nodes bilaterally.  There is no lymphadenopathy at the  visualized upper abdomen. Bilateral  nephrolithiasis is noted at the visualized kidneys.       Impression:      1. There is no convincing evidence for pulmonary thromboemboli.  2. There are minimal bilateral pleural effusions and pericardial  effusion. There is also significant bilateral axillary and subpectoral  lymphadenopathy. The lymphadenopathy may be reactive, but is worrisome  and is indeterminate at this point. Close follow-up is recommended.     CHECK CHECK       XR Chest 1 View [[id number]] Collected: 12/19/22 1110     Updated: 12/19/22 1115    Narrative:      XR CHEST 1 VW-     Clinical: Chest pain     COMPARISON: None     FINDINGS: There is a nodular density at the right first rib and which  likely represents calcification. I would recommend follow-up PA and  lateral view of the chest with apical lordotic view when patient's  condition permits. Heart size within normal limits, no mediastinal or  hilar abnormality.     No effusion, edema or acute airspace disease identified. The remainder  is unremarkable.     This report was finalized on 12/19/2022 11:12 AM by Dr. Donavan Pena M.D.             Pertinent Labs     Results from last 7 days   Lab Units 12/20/22  0433 12/19/22  1021   WBC 10*3/mm3 4.18 6.94   HEMOGLOBIN g/dL 8.4* 9.2*   PLATELETS 10*3/mm3 352 376     Results from last 7 days   Lab Units 12/20/22  0433 12/19/22  1021   SODIUM mmol/L 136 140   POTASSIUM mmol/L 3.3* 3.8   CHLORIDE mmol/L 103 106   CO2 mmol/L 23.3 22.1   BUN mg/dL 15 12   CREATININE mg/dL 0.72 0.62   GLUCOSE mg/dL 122* 104*   Estimated Creatinine Clearance: 105.9 mL/min (by C-G formula based on SCr of 0.72 mg/dL).  Results from last 7 days   Lab Units 12/20/22  0433 12/19/22  1021   ALBUMIN g/dL 3.10* 3.70   BILIRUBIN mg/dL 0.4 0.6   ALK PHOS U/L 31* 33*   AST (SGOT) U/L 15 16   ALT (SGPT) U/L 18 13     Results from last 7 days   Lab Units 12/20/22  0433 12/19/22  1021   CALCIUM mg/dL 8.5* 8.7   ALBUMIN g/dL 3.10* 3.70    MAGNESIUM mg/dL  --  1.9       Results from last 7 days   Lab Units 12/19/22  1021   TROPONIN T ng/mL <0.010   PROBNP pg/mL 524.0*   D DIMER QUANT MCGFEU/mL 3.56*           Invalid input(s): LDLCALC        Test Results Pending at Discharge       Discharge Details        Discharge Medications      Continue These Medications      Instructions Start Date   BIOTIN PO   Oral, 2 Times Daily      calcium citrate-vitamin d 200-250 MG-UNIT tablet tablet  Commonly known as: CITRACAL   Oral, Daily      dextromethorphan-guaifenesin  MG per 12 hr tablet  Commonly known as: MUCINEX DM   1 tablet, Oral, Every 12 Hours      FEVERFEW PO   Oral      FISH OIL PO   Oral      folic acid 1 MG tablet  Commonly known as: FOLVITE   No dose, route, or frequency recorded.      hydroxychloroquine 200 MG tablet  Commonly known as: PLAQUENIL   Oral, 2 Times Daily, Unsure of dosage      IRON PO   1 tablet, Oral, Daily      Methotrexate Sodium 50 MG/2ML injection   Weekly, Last dose 12/18/22      multivitamin tablet tablet  Commonly known as: THERAGRAN   Oral      predniSONE 5 MG tablet  Commonly known as: DELTASONE   Daily      riTUXimab 10 MG/ML solution injection  Commonly known as: RITUXAN   Intravenous, Once             Allergies   Allergen Reactions   • Sulfa Antibiotics Swelling and Anaphylaxis     Swelling of her lips         Discharge Disposition:  Home or Self Care    Discharge Diet:  Diet Order   Procedures   • Diet: Regular/House Diet; Texture: Regular Texture (IDDSI 7); Fluid Consistency: Thin (IDDSI 0)       Discharge Activity: as tolerated       CODE STATUS:    Code Status and Medical Interventions:   Ordered at: 12/19/22 6496     Code Status (Patient has no pulse and is not breathing):    CPR (Attempt to Resuscitate)     Medical Interventions (Patient has pulse or is breathing):    Full Support       No future appointments.   Follow-up Information     Lynn De Paz, APRN .    Specialty: Family Medicine  Contact  information:  [address]  [address]  [facility] [address]  [phone]                         Time Spent on Discharge:  Greater than 30 minutes      Serafin Wilson MD  [facility]  12/20/22  13:04 EST

## 2022-12-20 NOTE — PLAN OF CARE
Alert, vss. Room air. Ad casimiro. Pain treated x 2, Toradol and ibuprofen. Ad casimiro. Hem onc to see. Iv saline locked.    Problem: Adult Inpatient Plan of Care  Goal: Absence of Hospital-Acquired Illness or Injury  Intervention: Prevent Skin Injury  Recent Flowsheet Documentation  Taken 12/20/2022 0205 by Mallory Eller RN  Body Position:   right   side-lying  Taken 12/19/2022 2355 by Mallory Eller RN  Body Position:   position changed independently   sitting up in bed  Skin Protection:   adhesive use limited   incontinence pads utilized  Taken 12/19/2022 2200 by Mallory Eller RN  Body Position:   position changed independently   sitting up in bed  Taken 12/19/2022 1926 by Mallory Eller RN  Body Position: sitting up in bed  Skin Protection: adhesive use limited   Goal Outcome Evaluation:

## 2022-12-20 NOTE — CONSULTS
Subjective     REASON FOR CONSULTATION:    Evaluation and management for lymphadenopathy                             REQUESTING PHYSICIAN:  MD Qasim    RECORDS OBTAINED:  Records of the patients history including those obtained from the referring provider were reviewed and summarized in detail.    HISTORY OF PRESENT ILLNESS:  The patient is a 47 y.o. year old female with medical history significant for rheumatoid arthritis, vasculitis who had presented to [facility] ER on 12/19/2022 with palpitations and chest discomfort.  Patient's symptoms had started 2-3 days prior to presentation.  Given strong family history of heart disease, patient decided to come to the ER.    The CTA chest performed 12/19/2022 noted no convincing evidence of PE, minimal bilateral pleural and pericardial effusion.  There is also significant bilateral axillary and subpectoral lymphadenopathy.    Given these findings hematology/oncology consulted for further evaluation and management.  She has a longstanding history of rheumatoid arthritis (more than 10 years) and has been on multiple treatments including Humira, Enbrel, methotrexate, prednisone and Plaquenil.  Patient states that she was recently diagnosed with vasculitis of bilateral lower extremity and started on treatment with rituximab.  Second dose was on 12/8/2022.  She follows up closely with Dr. Matias with rheumatology.    Patient denies any fever, chills or night sweats.  No weight loss.  No palpable lymphadenopathy.  No family history of lymphoma or malignancies.  No history of tobacco/alcohol/drug abuse.    Past Medical History:   Diagnosis Date   • Anemia    • Rheumatoid arthritis (HCC)    • Vasculitis (HCC)         Past Surgical History:   Procedure Laterality Date   • BIOPSY OF LEG     • COLONOSCOPY N/A 10/14/2022    Procedure: COLONOSCOPY into cecum and normal TI;  Surgeon: Jerson Trejo MD;  [facility] ENDOSCOPY;  Service: Gastroenterology;   Laterality: N/A;  pre: family hx of colon polyps   post: diverticulosis,  hemorrhoids   • LASIK          No current facility-administered medications on file prior to encounter.     Current Outpatient Medications on File Prior to Encounter   Medication Sig Dispense Refill   • BIOTIN PO Take  by mouth 2 (Two) Times a Day.     • calcium citrate-vitamin d (CITRACAL) 200-250 MG-UNIT tablet tablet Take  by mouth Daily.     • dextromethorphan-guaifenesin (MUCINEX DM)  MG per 12 hr tablet Take 1 tablet by mouth Every 12 (Twelve) Hours.     • Ferrous Sulfate (IRON PO) Take 1 tablet by mouth Daily.     • FEVERFEW PO Take  by mouth.     • folic acid (FOLVITE) 1 MG tablet      • Methotrexate Sodium 50 MG/2ML injection 1 (One) Time Per Week. Last dose 12/18/22     • Multiple Vitamins-Minerals (MULTIVITAMIN PO) Take  by mouth.     • Omega-3 Fatty Acids (FISH OIL PO) Take  by mouth.     • predniSONE (DELTASONE) 5 MG tablet Daily.     • riTUXimab (RITUXAN) 10 MG/ML solution injection Infuse  into a venous catheter 1 (One) Time.     • hydroxychloroquine (PLAQUENIL) 200 MG tablet Take  by mouth 2 (Two) Times a Day. Unsure of dosage          ALLERGIES:    Allergies   Allergen Reactions   • Sulfa Antibiotics Swelling and Anaphylaxis     Swelling of her lips        Social History     Socioeconomic History   • Marital status:    • Number of children: 2   Tobacco Use   • Smoking status: Never   • Smokeless tobacco: Never   Vaping Use   • Vaping Use: Never used   Substance and Sexual Activity   • Alcohol use: No   • Drug use: No   • Sexual activity: Yes     Partners: Male     Birth control/protection: Vasectomy        Family History   Problem Relation Age of Onset   • Hypertension Mother    • Thyroid nodules Mother    • Hypertension Father    • Hypothyroidism Sister    • Diabetes Maternal Grandmother    • COPD Maternal Grandfather    • Alcohol abuse Maternal Grandfather    • Alzheimer's disease Paternal Grandmother    • COPD  "Paternal Grandfather    • Heart disease Paternal Uncle    • Heart disease Paternal Aunt    • No Known Problems Daughter    • No Known Problems Son         Review of Systems   GENERAL: No change in appetite or weight;   No fevers, chills, sweats.    SKIN: No nonhealing lesions.   No rashes.  HEME/LYMPH: No easy bruising, bleeding.   No swollen nodes.   EYES: No vision changes or diplopia.   ENT: No tinnitus, hearing loss, gum bleeding, epistaxis, hoarseness or dysphagia.   RESPIRATORY: No cough, shortness of breath, hemoptysis or wheezing.   CVS: No chest pain, palpitations, orthopnea, dyspnea on exertion or PND.   GI: No melena or hematochezia.   No abdominal pain.  No nausea, vomiting, constipation, diarrhea  : No lower tract obstructive symptoms, dysuria or hematuria.   MUSCULOSKELETAL: No bone pain.  No joint stiffness.   NEUROLOGICAL: No global weakness, loss of consciousness or seizures.   PSYCHIATRIC: No increased nervousness, mood changes or depression.     Objective     Vitals:    12/19/22 1539 12/19/22 2038 12/19/22 2253 12/20/22 0757   BP: 141/77 132/69 120/70 124/73   BP Location:  Right arm Right arm Right arm   Patient Position:  Lying Sitting Sitting   Pulse: 85 88 89 89   Resp:  16 15 18   Temp:  98.3 °F (36.8 °C) 98.8 °F (37.1 °C) 98.2 °F (36.8 °C)   TempSrc:  Temporal Oral Oral   SpO2:  98% 97% 99%   Weight: 97 kg (213 lb 13.5 oz)      Height: 160 cm (62.99\")        No flowsheet data found.    Physical Exam   CONSTITUTIONAL:  Vital signs reviewed.  No distress, looks comfortable.  EYES:  Conjunctiva and lids unremarkable.    EARS,NOSE,MOUTH,THROAT:  Ears and nose appear unremarkable.    RESPIRATORY:  Normal respiratory effort.  Lungs clear to auscultation bilaterally.  CARDIOVASCULAR:  Normal S1, S2.  No murmurs rubs or gallops.  No significant lower extremity edema.  GASTROINTESTINAL: Abdomen appears unremarkable.  Nondistended   LYMPHATIC:  No cervical, supraclavicular lymphadenopathy.  SKIN:  " Warm.  No rashes.  Bilateral lower extremity mottled appearance.  PSYCHIATRIC:  Normal judgment and insight.  Normal mood and affect.  NEURO: AAOx3, no obvious focal deficits.      RECENT LABS:  Hematology WBC   Date Value Ref Range Status   12/20/2022 4.18 3.40 - 10.80 10*3/mm3 Final     RBC   Date Value Ref Range Status   12/20/2022 3.45 (L) 3.77 - 5.28 10*6/mm3 Final     Hemoglobin   Date Value Ref Range Status   12/20/2022 8.4 (L) 12.0 - 15.9 g/dL Final     Hematocrit   Date Value Ref Range Status   12/20/2022 27.6 (L) 34.0 - 46.6 % Final     Platelets   Date Value Ref Range Status   12/20/2022 352 140 - 450 10*3/mm3 Final          Assessment & Plan   Mrs. Chicas is a pleasant 47-year-old female with medical history significant for rheumatoid arthritis, vasculitis and anemia who has been noted to have bilateral axillary and subpectoral lymphadenopathy.    #Bilateral axillary and subpectoral lymphadenopathy:  · Noted on a CTA chest performed 12/19/2022 in view of palpitation and chest discomfort evaluation in the ER.  There are enlarged nodes at both axilla with one of the largest on the right measuring 4 x 2.5 cm.  No lymphadenopathy at the visualized upper abdomen.  · No B symptoms or palpable lymphadenopathy in the neck/supraclavicular region.  · Given patient's strong autoimmune disease history including rheumatoid arthritis and vasculitis, this does appear to be reactive in nature.  However, she also has high risk for lymphoproliferative disease/lymphoma in view of many years of immunosuppressive treatment for rheumatoid arthritis.  · Discussed plan to obtain a PET/CT in 2-3 weeks time as outpatient.  If abnormal hypermetabolism, will perform biopsy.    #Rheumatoid arthritis and vasculitis:   · Patient is on rituximab, Plaquenil, methotrexate and prednisone.  · Follows up with Dr. Matias, rheumatology.    #Anemia, normocytic:   · Hemoglobin 9.2 g/dL on admission.  Acute on chronic.  Likely anemia of chronic  disease related to RA/vasculitis  · Continue oral iron and folic acid supplementation.    #Chest discomfort and palpitation: CTA chest with no acute abnormalities, other than lymphadenopathy.  Echo pending.  Troponin negative.    Plan:  -Bilateral axillary and subpectoral lymphadenopathy likely reactive, however would rule out neoplasm.  -Plan to perform PET/CT as outpatient in 2-3 weeks time.  -Will perform biopsy if needed  -Patient was advised to maintain close follow-up with rheumatology.  -Will see her back in clinic after the PET.    Please call us with any questions.

## 2022-12-20 NOTE — PLAN OF CARE
Problem: Adult Inpatient Plan of Care  Goal: Plan of Care Review  Outcome: Met  Goal: Patient-Specific Goal (Individualized)  Outcome: Met  Goal: Absence of Hospital-Acquired Illness or Injury  Outcome: Met  Intervention: Identify and Manage Fall Risk  Recent Flowsheet Documentation  Taken 12/20/2022 1400 by Yohana Snowden RN  Safety Promotion/Fall Prevention: safety round/check completed  Taken 12/20/2022 1200 by Yohana Snowden RN  Safety Promotion/Fall Prevention: safety round/check completed  Taken 12/20/2022 1000 by Yohana Snowden RN  Safety Promotion/Fall Prevention: safety round/check completed  Taken 12/20/2022 0800 by Yohana Snowden RN  Safety Promotion/Fall Prevention: safety round/check completed  Intervention: Prevent Skin Injury  Recent Flowsheet Documentation  Taken 12/20/2022 0800 by Yohana Snowden RN  Body Position: position changed independently  Intervention: Prevent and Manage VTE (Venous Thromboembolism) Risk  Recent Flowsheet Documentation  Taken 12/20/2022 1400 by Yohana Snowden RN  Activity Management: activity adjusted per tolerance  Taken 12/20/2022 0800 by Yohana Snowden RN  Activity Management: up ad casimiro  Goal: Optimal Comfort and Wellbeing  Outcome: Met  Intervention: Provide Person-Centered Care  Recent Flowsheet Documentation  Taken 12/20/2022 1400 by Yohana Snowden RN  Trust Relationship/Rapport: care explained  Taken 12/20/2022 0800 by Yohana Snowden RN  Trust Relationship/Rapport: care explained  Goal: Readiness for Transition of Care  Outcome: Met   Goal Outcome Evaluation:

## 2022-12-20 NOTE — OUTREACH NOTE
Prep Survey    Flowsheet Row Responses   [facility] patient discharged from? [place]   Is LACE score < 7 ? Yes   Eligibility [facility]   Date of Admission 12/19/22   Date of Discharge 12/20/22   Discharge Disposition Home or Self Care   Discharge diagnosis Chest Pain   Does the patient have one of the following disease processes/diagnoses(primary or secondary)? Other   Does the patient have Home health ordered? No   Is there a DME ordered? No   Prep survey completed? Yes          MANOLO REYNAGA - Registered Nurse

## 2022-12-21 ENCOUNTER — TRANSITIONAL CARE MANAGEMENT TELEPHONE ENCOUNTER (OUTPATIENT)
Dept: CALL CENTER | Facility: HOSPITAL | Age: 47
End: 2022-12-21

## 2022-12-21 NOTE — OUTREACH NOTE
Call Center TCM Note    Flowsheet Row Responses   [facility] patient discharged from? [place]   Does the patient have one of the following disease processes/diagnoses(primary or secondary)? Other   TCM attempt successful? Yes   Discharge diagnosis Chest Pain   Meds reviewed with patient/caregiver? Yes   Does the patient have all medications ordered at discharge? N/A   Comments Pt would like to see PCP Lynn De Paz but does not feel up to ofc appt, TCM APPT by VIDEO VISIT is sched for 12/28/2022.   Does the patient have an appointment with their PCP within 7 days of discharge? Yes   Has home health visited the patient within 72 hours of discharge? N/A   Psychosocial issues? No   Did the patient receive a copy of their discharge instructions? Yes   Nursing interventions Reviewed instructions with patient   What is the patient's perception of their health status since discharge? Same   Is the patient/caregiver able to teach back signs and symptoms related to disease process for when to call PCP? Yes   Is the patient/caregiver able to teach back signs and symptoms related to disease process for when to call 911? Yes   Is the patient/caregiver able to teach back the hierarchy of who to call/visit for symptoms/problems? PCP, Specialist, Home health nurse, Urgent Care, ED, 911 Yes   If the patient is a current smoker, are they able to teach back resources for cessation? Not a smoker   TCM call completed? Yes   Wrap up additional comments D/C DX: chest/bakc pain,  N/V,  SOB - Pt continues with chest/back pain, GI issues better, she is eating and drinking. No changes to medications. Pt sched for PET 01/10/2023. Pt would like to see PCP Lynn De Paz but does not feel up to ofc appt, TCM APPT by VIDEO VISIT is sched for 12/28/2022.          Yokatsa Rowell MA    12/21/2022, 15:10 EST

## 2023-01-10 ENCOUNTER — APPOINTMENT (OUTPATIENT)
Dept: PET IMAGING | Facility: HOSPITAL | Age: 48
End: 2023-01-10
Payer: COMMERCIAL

## 2023-01-11 ENCOUNTER — HOSPITAL ENCOUNTER (OUTPATIENT)
Dept: CARDIOLOGY | Facility: HOSPITAL | Age: 48
Discharge: HOME | End: 2023-01-11
Payer: COMMERCIAL

## 2023-01-11 ENCOUNTER — OFFICE VISIT (OUTPATIENT)
Dept: CARDIOLOGY | Facility: CLINIC | Age: 48
End: 2023-01-11
Payer: COMMERCIAL

## 2023-01-11 VITALS
HEART RATE: 115 BPM | SYSTOLIC BLOOD PRESSURE: 124 MMHG | HEIGHT: 63 IN | DIASTOLIC BLOOD PRESSURE: 70 MMHG | WEIGHT: 214.6 LBS | BODY MASS INDEX: 38.02 KG/M2

## 2023-01-11 DIAGNOSIS — R00.0 TACHYCARDIA: ICD-10-CM

## 2023-01-11 DIAGNOSIS — M06.9 PERICARDITIS ASSOCIATED WITH RHEUMATOID ARTHRITIS, UNSPECIFIED CHRONICITY: Primary | ICD-10-CM

## 2023-01-11 DIAGNOSIS — T80.1XXA INTRAVENOUS INFILTRATION, INITIAL ENCOUNTER: Primary | ICD-10-CM

## 2023-01-11 DIAGNOSIS — M06.9 RHEUMATOID ARTHRITIS, INVOLVING UNSPECIFIED SITE, UNSPECIFIED WHETHER RHEUMATOID FACTOR PRESENT: ICD-10-CM

## 2023-01-11 DIAGNOSIS — I31.9 PERICARDITIS ASSOCIATED WITH RHEUMATOID ARTHRITIS, UNSPECIFIED CHRONICITY: ICD-10-CM

## 2023-01-11 DIAGNOSIS — M06.9 PERICARDITIS ASSOCIATED WITH RHEUMATOID ARTHRITIS, UNSPECIFIED CHRONICITY: ICD-10-CM

## 2023-01-11 DIAGNOSIS — I31.9 PERICARDITIS ASSOCIATED WITH RHEUMATOID ARTHRITIS, UNSPECIFIED CHRONICITY: Primary | ICD-10-CM

## 2023-01-11 DIAGNOSIS — R06.09 DYSPNEA ON EXERTION: ICD-10-CM

## 2023-01-11 DIAGNOSIS — G47.33 OSA (OBSTRUCTIVE SLEEP APNEA): ICD-10-CM

## 2023-01-11 LAB
BH CV ECHO MEAS - EDV(MOD-SP2): 58 ML
BH CV ECHO MEAS - EDV(MOD-SP4): 76 ML
BH CV ECHO MEAS - EF(MOD-BP): 60.5 %
BH CV ECHO MEAS - EF(MOD-SP2): 58.6 %
BH CV ECHO MEAS - EF(MOD-SP4): 64.5 %
BH CV ECHO MEAS - ESV(MOD-SP2): 24 ML
BH CV ECHO MEAS - ESV(MOD-SP4): 27 ML
BH CV ECHO MEAS - LV DIASTOLIC VOL/BSA (35-75): 38.2 CM2
BH CV ECHO MEAS - LV SYSTOLIC VOL/BSA (12-30): 13.6 CM2
BH CV ECHO MEAS - RAP SYSTOLE: 3 MMHG
BH CV ECHO MEAS - RVSP: 18.7 MMHG
BH CV ECHO MEAS - SI(MOD-SP2): 17.1 ML/M2
BH CV ECHO MEAS - SI(MOD-SP4): 24.6 ML/M2
BH CV ECHO MEAS - SV(MOD-SP2): 34 ML
BH CV ECHO MEAS - SV(MOD-SP4): 49 ML
BH CV ECHO MEAS - TR MAX PG: 15.7 MMHG
BH CV ECHO MEAS - TR MAX VEL: 198.2 CM/SEC
MAXIMAL PREDICTED HEART RATE: 172 BPM
STRESS TARGET HR: 146 BPM

## 2023-01-11 PROCEDURE — 93325 DOPPLER ECHO COLOR FLOW MAPG: CPT

## 2023-01-11 PROCEDURE — 93308 TTE F-UP OR LMTD: CPT

## 2023-01-11 PROCEDURE — 93325 DOPPLER ECHO COLOR FLOW MAPG: CPT | Performed by: INTERNAL MEDICINE

## 2023-01-11 PROCEDURE — 93321 DOPPLER ECHO F-UP/LMTD STD: CPT | Performed by: INTERNAL MEDICINE

## 2023-01-11 PROCEDURE — 99204 OFFICE O/P NEW MOD 45 MIN: CPT | Performed by: INTERNAL MEDICINE

## 2023-01-11 PROCEDURE — 96374 THER/PROPH/DIAG INJ IV PUSH: CPT

## 2023-01-11 PROCEDURE — 93308 TTE F-UP OR LMTD: CPT | Performed by: INTERNAL MEDICINE

## 2023-01-11 PROCEDURE — 93321 DOPPLER ECHO F-UP/LMTD STD: CPT

## 2023-01-11 PROCEDURE — 93000 ELECTROCARDIOGRAM COMPLETE: CPT | Performed by: INTERNAL MEDICINE

## 2023-01-11 PROCEDURE — 36415 COLL VENOUS BLD VENIPUNCTURE: CPT

## 2023-01-11 RX ORDER — ASPIRIN 325 MG
650 TABLET ORAL 3 TIMES DAILY
Qty: 180 TABLET | Refills: 3 | COMMUNITY
End: 2023-02-16 | Stop reason: SDUPTHER

## 2023-01-11 RX ORDER — COLCHICINE 0.6 MG/1
0.6 TABLET ORAL 2 TIMES DAILY
Qty: 60 TABLET | Refills: 3 | Status: SHIPPED | OUTPATIENT
Start: 2023-01-11 | End: 2023-03-20 | Stop reason: SDUPTHER

## 2023-01-11 RX ORDER — SODIUM CHLORIDE 9 MG/ML
250 INJECTION, SOLUTION INTRAVENOUS ONCE
Status: COMPLETED | OUTPATIENT
Start: 2023-01-11 | End: 2023-01-11

## 2023-01-11 RX ADMIN — SODIUM CHLORIDE 250 ML/HR: 9 INJECTION, SOLUTION INTRAVENOUS at 11:00

## 2023-01-11 NOTE — PROGRESS NOTES
Date of Office Visit: 2023  Encounter Provider: Liza Quene MD  Place of Service: [facility] CARDIOLOGY  Patient Name: Lauren Chicas  :1975      Patient ID:  Lauren Chicas is a 47 y.o. female is here for pericarditis.          History of Present Illness    She is here for pericarditis associated similar to arthritis.  She has a history of rheumatoid arthritis which has been very difficult to manage and she follows Dr. Ivette Matias.  She has a history of obstructive sleep apnea on CPAP, obesity and chronic iron deficiency anemia.  She has polyartertis nodosa (medial artery vasculitis) and follows with Dr. Ivette Matias, is on Rituxan for this.     She is , has 2 children, works as , is never smoked, uses no alcohol, has 2 cups of coffee per day and no drugs.  Her mother and father have hypertension and her maternal grandmother has diabetes.    She developed severe chest pain 1 week after her 2nd dose or Rituxan in 2022.     On 12/15/2022, she began noticing her heart racing.  On 2022, she woke at 5 AM with a sharp pain in her chest and back as well as short windedness.  The patient was present with upper breath.  She was very nauseated and moved from her bedroom to the couch in her living room where she collapsed and could not get up for about 5 hours.  She was then able to move about but felt poorly through the whole weekend she then on 2022 got up to go to work and when she arrived, she did not feel well and looked poorly.  A coworker advised her to go to the emergency department.  She went to [facility] emergency department and was admitted overnight.  She had an echocardiogram on 2022 which showed ejection fraction of 63% with mild mitral tricuspid insufficiency, normal RVSP and grade 1 diastolic dysfunction.  The pericardium was not well visualized.  Her ECG showed ST elevation and SC depression insistent with  acute pericarditis.  CTA of the chest showed no evidence of pulmonary embolism or aortic dissection, really no pleural effusions but small circumferential pericardial fluid, small calcified area in the mid LAD.  She was released and felt slightly better.    She started feeling worse again over the holidays and had severe chest pain on 1/3/2023 and went to the emergency department at [facility].  Labs on 1/6/2023 show normal CMP, normal lactate, low CK at 25, hemoglobin 7.8, normal platelets and white count.  She had elevated ESR done 1/3/2023 with elevated free kappa and free lambda light chains with a normal free kappa and lambda ratio.  Her protein electrophoresis showed low albumin and increased alpha-1 globulins, just over normal.  Her immunofixation electrophoresis showed elevated IgG, IgA and IgM.  Lipids done 1/3/2023 showed total cholesterol 115, HDL 38, LDL 65, VLDL 12, triglycerides 59, hemoglobin A1c 6%.  She had an echocardiogram done 1/3/2023 at [facility] which showed ejection fraction 59% with no pericardial effusion.  She had a CTA for PE done 1/3/2023 which showed biaxillary lymphadenopathy, no evidence of pulmonary embolism or aortic dissection, small bilateral pleural effusions, nonspecific mild thickening of pericardium.  She went there because she thought they to rheumatologist on staff. She did have a lymph node biopsy there when was normal. She was treated with steroids and is still on steroids at this point.    She has pulses paradoxus of 6mmHg.  He has significant dyspnea at rest but her saturations are normal.  She has significant pain in her back and chest when she tries to lay back which gets somewhat better when she sits up but does not go away.  It is painful still for her to breathe.  She does not have dizziness and she has not felt like she is going to pass out but she is fatigued.  She is had no fevers or chills.  She is on 20 mg of prednisone normally for her rheumatoid but that dose  has been increased because of the inflammation.  They actually thought originally that the chest pain that she was having was a serum sickness reaction to the second dose of Rituxan given earlier in December but in hindsight this may have all been pericarditis.    Hemoglobin was 8.7 on 1/10/2023.    Past Medical History:   Diagnosis Date   • Anemia    • Rheumatoid arthritis (HCC)    • Vasculitis (HCC)          Past Surgical History:   Procedure Laterality Date   • BIOPSY OF LEG     • COLONOSCOPY N/A 10/14/2022    Procedure: COLONOSCOPY into cecum and normal TI;  Surgeon: Jerson Trejo MD;  Location: [facility] ENDOSCOPY;  Service: Gastroenterology;  Laterality: N/A;  pre: family hx of colon polyps   post: diverticulosis,  hemorrhoids   • LASIK         Current Outpatient Medications on File Prior to Visit   Medication Sig Dispense Refill   • BIOTIN PO Take  by mouth 2 (Two) Times a Day.     • calcium citrate-vitamin d (CITRACAL) 200-250 MG-UNIT tablet tablet Take  by mouth Daily.     • dextromethorphan-guaifenesin (MUCINEX DM)  MG per 12 hr tablet Take 1 tablet by mouth Every 12 (Twelve) Hours.     • Ferrous Sulfate (IRON PO) Take 1 tablet by mouth Daily.     • FEVERFEW PO Take  by mouth.     • folic acid (FOLVITE) 1 MG tablet      • hydroxychloroquine (PLAQUENIL) 200 MG tablet Take  by mouth 2 (Two) Times a Day. Unsure of dosage     • Methotrexate Sodium 50 MG/2ML injection 1 (One) Time Per Week. Last dose 12/18/22     • Multiple Vitamins-Minerals (MULTIVITAMIN PO) Take  by mouth.     • Omega-3 Fatty Acids (FISH OIL PO) Take  by mouth.     • predniSONE (DELTASONE) 5 MG tablet Daily.     • riTUXimab (RITUXAN) 10 MG/ML solution injection Infuse  into a venous catheter 1 (One) Time.       No current facility-administered medications on file prior to visit.       Social History     Socioeconomic History   • Marital status:    • Number of children: 2   Tobacco Use   • Smoking status: Never   • Smokeless  "tobacco: Never   Vaping Use   • Vaping Use: Never used   Substance and Sexual Activity   • Alcohol use: No     Comment: Caffeine use: 2 cups daily   • Drug use: No   • Sexual activity: Yes     Partners: Male     Birth control/protection: Vasectomy           ROS    Procedures    ECG 12 Lead    Date/Time: 1/11/2023 9:29 AM  Performed by: Liza Queen MD  Authorized by: Liza Queen MD   Comparison: compared with previous ECG   Comparison to previous ECG: ecg 12/19/22 showed MI depression with st elevation consistent with acute pericarditis.   Rhythm: sinus tachycardia  T flattening: all    Clinical impression: abnormal EKG                Objective:      Vitals:    01/11/23 0909 01/11/23 0910   BP: 120/70 124/70   BP Location: Left arm Right arm   Pulse: 115    Weight: 97.3 kg (214 lb 9.6 oz)    Height: 160 cm (63\")      Body mass index is 38.01 kg/m².    Vitals reviewed.   Constitutional:       General: Not in acute distress.     Appearance: Well-developed. Not diaphoretic.   Eyes:      General: No scleral icterus.     Conjunctiva/sclera: Conjunctivae normal.   HENT:      Head: Normocephalic and atraumatic.   Neck:      Thyroid: No thyromegaly.      Vascular: No carotid bruit or JVD.      Lymphadenopathy: No cervical adenopathy.   Pulmonary:      Effort: Pulmonary effort is normal. No respiratory distress.      Breath sounds: Normal breath sounds. No wheezing. No rhonchi. No rales.   Chest:      Chest wall: Not tender to palpatation.   Cardiovascular:      Tachycardia present. Regular rhythm.      Murmurs: There is no murmur.      No gallop.   Pulses:     Intact distal pulses.   Edema:     Peripheral edema absent.   Abdominal:      General: Bowel sounds are normal. There is no distension or abdominal bruit.      Palpations: Abdomen is soft. There is no abdominal mass.      Tenderness: There is no abdominal tenderness.   Musculoskeletal:         General: No deformity.      Extremities: No clubbing " present.     Cervical back: Neck supple. Skin:     General: Skin is warm and dry. There is no cyanosis.      Coloration: Skin is not pale.      Findings: No rash.   Neurological:      Mental Status: Alert and oriented to person, place, and time.      Cranial Nerves: No cranial nerve deficit.   Psychiatric:         Judgment: Judgment normal.         Lab Review:       Assessment:      Diagnosis Plan   1. Pericarditis associated with rheumatoid arthritis, unspecified chronicity ([facility])  Adult Transthoracic Echo Limited W/ Cont if Necessary Per Protocol    sodium chloride 0.9 % infusion      2. Rheumatoid arthritis, involving unspecified site, unspecified whether rheumatoid factor present ([facility])        3. MICHAEL (obstructive sleep apnea)        4. Tachycardia  sodium chloride 0.9 % infusion      5. Dyspnea on exertion          1. Acute pericarditis, tachycardia with mild pulses paradoxus, will give saline bolus of 500 cc, check stat limited echocardiogram pericardial effusion, decrease prednisone to 20 mg daily, start aspirin 650 mg 3 times daily and colchicine 0.6 twice daily.  May need anakinra.  2. Rheumatoid arthritis, difficult to manage, follows with Dr. Ivette Matias  3. Polyarteritis nodosa, has been treated with Rituxan for this-Rituxan was started December 2022, may have reacted to it  4. Iron deficiency anemia, chronic  5. MICHAEL on CPAP     Plan:       Plan is as above, aspirin and colchicine sent in, await echocardiogram.  I did speak with Dr. Ivette Matias about plan of care as well as her rheumatologic pathology.  We will have her follow-up again on Friday.    She will take ASA EC 325mg 2-3 tablets every 8 hours, f/u in Friday in [place] with Jennifer.     Echo done today shows no tamponade.

## 2023-01-12 ENCOUNTER — TELEPHONE (OUTPATIENT)
Dept: CARDIOLOGY | Facility: CLINIC | Age: 48
End: 2023-01-12
Payer: COMMERCIAL

## 2023-01-13 ENCOUNTER — HOSPITAL ENCOUNTER (OUTPATIENT)
Dept: INFUSION THERAPY | Facility: HOSPITAL | Age: 48
Discharge: HOME | End: 2023-01-13
Admitting: NURSE PRACTITIONER
Payer: COMMERCIAL

## 2023-01-13 ENCOUNTER — OFFICE VISIT (OUTPATIENT)
Dept: CARDIOLOGY | Facility: CLINIC | Age: 48
End: 2023-01-13
Payer: COMMERCIAL

## 2023-01-13 VITALS
HEIGHT: 63 IN | OXYGEN SATURATION: 97 % | BODY MASS INDEX: 37.92 KG/M2 | DIASTOLIC BLOOD PRESSURE: 70 MMHG | SYSTOLIC BLOOD PRESSURE: 124 MMHG | HEART RATE: 107 BPM | WEIGHT: 214 LBS

## 2023-01-13 VITALS
RESPIRATION RATE: 20 BRPM | SYSTOLIC BLOOD PRESSURE: 126 MMHG | OXYGEN SATURATION: 95 % | HEART RATE: 103 BPM | WEIGHT: 210 LBS | HEIGHT: 63 IN | TEMPERATURE: 98.1 F | DIASTOLIC BLOOD PRESSURE: 69 MMHG | BODY MASS INDEX: 37.21 KG/M2

## 2023-01-13 DIAGNOSIS — R07.1 CHEST PAIN ON BREATHING: ICD-10-CM

## 2023-01-13 DIAGNOSIS — G47.33 OSA (OBSTRUCTIVE SLEEP APNEA): ICD-10-CM

## 2023-01-13 DIAGNOSIS — I31.9 PLEUROPERICARDITIS: Primary | ICD-10-CM

## 2023-01-13 DIAGNOSIS — I31.39 PERICARDIAL EFFUSION: Primary | ICD-10-CM

## 2023-01-13 DIAGNOSIS — D50.9 IRON DEFICIENCY ANEMIA, UNSPECIFIED IRON DEFICIENCY ANEMIA TYPE: ICD-10-CM

## 2023-01-13 DIAGNOSIS — I77.6 VASCULITIS: ICD-10-CM

## 2023-01-13 DIAGNOSIS — I31.39 PERICARDIAL EFFUSION: ICD-10-CM

## 2023-01-13 DIAGNOSIS — M06.9 RHEUMATOID ARTHRITIS, INVOLVING UNSPECIFIED SITE, UNSPECIFIED WHETHER RHEUMATOID FACTOR PRESENT: ICD-10-CM

## 2023-01-13 DIAGNOSIS — I31.9 PLEUROPERICARDITIS: ICD-10-CM

## 2023-01-13 PROCEDURE — 93000 ELECTROCARDIOGRAM COMPLETE: CPT | Performed by: NURSE PRACTITIONER

## 2023-01-13 PROCEDURE — 96360 HYDRATION IV INFUSION INIT: CPT

## 2023-01-13 PROCEDURE — 99214 OFFICE O/P EST MOD 30 MIN: CPT | Performed by: NURSE PRACTITIONER

## 2023-01-13 RX ORDER — SODIUM CHLORIDE 9 MG/ML
500 INJECTION, SOLUTION INTRAVENOUS ONCE
Status: COMPLETED | OUTPATIENT
Start: 2023-01-13 | End: 2023-01-13

## 2023-01-13 RX ORDER — SODIUM CHLORIDE 9 MG/ML
500 INJECTION, SOLUTION INTRAVENOUS ONCE
Status: CANCELLED | OUTPATIENT
Start: 2023-01-13 | End: 2023-01-13

## 2023-01-13 RX ADMIN — SODIUM CHLORIDE 500 ML/HR: 9 INJECTION, SOLUTION INTRAVENOUS at 14:48

## 2023-01-13 NOTE — NURSING NOTE
NURSE PROGRESS NOTE    PT. ARRIVED IN ACC FOR SCHEDULED INFUSION @ 1425 .  INFUSION WAS PERFORMED WITHOUT INCIDENT.  PT. ISSUED AVS AND VERBALIZES UNDERSTANDING, AND DISCHARGED AT 1608.

## 2023-01-13 NOTE — PROGRESS NOTES
"      [facility] CARDIOLOGY  [address]  [address]  [place] [address]  Phone: [phone]  Fax: [phone]  Patient Name: Lauren Chicas  :1975  Age: 47 y.o.  Primary Cardiologist: Liza Queen MD  Encounter Provider:  EDWINA Brown    History of Present Illness     Lauren Chicas is a 47 y.o.  female whose medical history includes rheumatoid arthritis, and obstructive sleep apnea.  She is followed in our office by Dr. Queen for pericarditis associated with rheumatoid arthritis. I have reviewed the past medical records in preparation of today's visit.     23 Follow-up:  She is here for 2-day follow-up and I am seeing her for the first time today.  She was seen on 2023 by Dr. Queen for acute pericarditis with tachycardia and mild pulsus paradoxus; treated with 500 cc normal saline, prednisone decreased to 20 mg daily, aspirin 650 mg 3 times daily was started, along with 0.6 mg colchicine twice daily.  Admitted echocardiogram showed no evidence of tamponade.  She felt great yesterday but her fatigue has returned today.  She continues to have chest and back pain especially when laying flat but this is somewhat better; the pain is worse on the right side.  She still has pain with inhalation.    Data Review     The following data was reviewed by EDWINA Brown on 23:    Vital Signs:   /70   Pulse 107   Ht 160 cm (63\")   Wt 97.1 kg (214 lb)   SpO2 97%   BMI 37.91 kg/m²       Weight:  Wt Readings from Last 3 Encounters:   23 97.1 kg (214 lb)   23 97.3 kg (214 lb 9.6 oz)   22 97 kg (213 lb 13.5 oz)     Body mass index is 37.91 kg/m².    Below is a summary of pertinent cardiology findings:  • She is , has 2 children, works as an , has never smoked, uses no alcohol, has 2 cups of coffee daily, and uses no drugs.  Family history includes her mother and " father with hypertension in her maternal grandmother with diabetes.  • History of rheumatoid arthritis which has been difficult to manage; she follows with Dr. Ivette Matias.  She also has polyarteritis nodosa and is on Rituxan per Dr. Matias.  She developed severe chest pain 1 week after her second dose of Rituxan in December 2022.  This was also associated with a feeling of her heart racing and short windedness.  • December 2022 admitted to [facility] for chest pain, and short windedness.  Echocardiogram showed EF 63%, mild mitral tricuspid insufficiencies, normal RVSP, grade 1 diastolic dysfunction, and the pericardium was not well visualized.  Her EKG showed ST elevation and MT depression consistent with acute pericarditis.  • December 2022 CTA chest showed no evidence of pulmonary as well as, aortic dissection, no pleural effusions but a small circumferential pericardial fluid, calcified area in the mid LAD.  • January 2023 started to feel worse again presented to the emergency room at [facility].  Hemoglobin was 7.8, ESR was elevated, free kappa and lambda light chains were both elevated though ratio normal, protein electrophoresis showed low albumin and increased alpha-1 globulins, and AIDEE showed elevated IgG, IgA, and IgM.  • January 2023 echocardiogram at [facility] showed EF 59% and no pericardial effusion.  CTA chest showed by axillary lymphadenopathy, no evidence of PE or aortic dissection, small bilateral pleural effusions, nonspecific mild thickening of the pericardium.  She had lymph node biopsy and was treated with steroids.  • January 2023 Limited echocardiogram shows EF 60.5%, normal RVSP, a small (less than 1 cm) pericardial effusion but no evidence of tamponade.    Labs:  • 01/06/2023:  cr 0.5, K 4.0, otherwise unremarkable CMP, Hgb 9.0, Plt 426      ECG 12 Lead    Date/Time: 1/13/2023 12:45 PM  Performed by: Lucy Singh APRN  Authorized by: Francisco  EDWINA Hussein   Comparison: compared with previous ECG from 1/11/2023  Similar to previous ECG  Rhythm: sinus rhythm  Rate: tachycardic  BPM: 103  T flattening: all  Other findings: T wave abnormality    Clinical impression: abnormal EKG            Medications     Allergies as of 01/13/2023 - Reviewed 01/13/2023   Allergen Reaction Noted   • Sulfa antibiotics Swelling and Anaphylaxis 09/19/2012         Current Outpatient Medications:   •  aspirin 325 MG tablet, Take 2 tablets by mouth 3 (Three) Times a Day., Disp: 180 tablet, Rfl: 3  •  BIOTIN PO, Take  by mouth 2 (Two) Times a Day., Disp: , Rfl:   •  calcium citrate-vitamin d (CITRACAL) 200-250 MG-UNIT tablet tablet, Take  by mouth Daily., Disp: , Rfl:   •  colchicine 0.6 MG tablet, Take 1 tablet by mouth 2 (Two) Times a Day., Disp: 60 tablet, Rfl: 3  •  dextromethorphan-guaifenesin (MUCINEX DM)  MG per 12 hr tablet, Take 1 tablet by mouth Every 12 (Twelve) Hours., Disp: , Rfl:   •  FEVERFEW PO, Take  by mouth., Disp: , Rfl:   •  folic acid (FOLVITE) 1 MG tablet, , Disp: , Rfl:   •  hydroxychloroquine (PLAQUENIL) 200 MG tablet, Take  by mouth 2 (Two) Times a Day. Unsure of dosage, Disp: , Rfl:   •  Methotrexate Sodium 50 MG/2ML injection, 1 (One) Time Per Week. Last dose 12/18/22, Disp: , Rfl:   •  Multiple Vitamins-Minerals (MULTIVITAMIN PO), Take  by mouth., Disp: , Rfl:   •  Omega-3 Fatty Acids (FISH OIL PO), Take  by mouth., Disp: , Rfl:   •  predniSONE (DELTASONE) 5 MG tablet, Daily., Disp: , Rfl:   •  riTUXimab (RITUXAN) 10 MG/ML solution injection, Infuse  into a venous catheter 1 (One) Time., Disp: , Rfl:      Past History, Review of Systems, Exam     Past Medical History:   Diagnosis Date   • Anemia    • Rheumatoid arthritis (HCC)    • Vasculitis (HCC)        Past Surgical History:   has a past surgical history that includes LASIK; Colonoscopy (N/A, 10/14/2022); and Leg Biopsy.     Social History     Socioeconomic History   • Marital status:     • Number of children: 2   Tobacco Use   • Smoking status: Never   • Smokeless tobacco: Never   Vaping Use   • Vaping Use: Never used   Substance and Sexual Activity   • Alcohol use: No     Comment: Caffeine use: 2 cups daily   • Drug use: No   • Sexual activity: Yes     Partners: Male     Birth control/protection: Vasectomy       Review of Systems   Constitutional: Positive for malaise/fatigue.   Cardiovascular: Positive for chest pain. Negative for claudication, cyanosis, dyspnea on exertion, irregular heartbeat, leg swelling, near-syncope, orthopnea, palpitations and syncope.   Respiratory: Positive for shortness of breath.    Skin: Positive for rash.   Musculoskeletal: Positive for back pain.       Vitals reviewed.   Constitutional:       Appearance: Not in distress.   Eyes:      Conjunctiva/sclera: Conjunctivae normal.      Pupils: Pupils are equal, round, and reactive to light.   HENT:      Head: Normocephalic.      Nose: Nose normal.    Mouth/Throat:      Pharynx: Oropharynx is clear.   Neck:      Vascular: JVD normal.   Pulmonary:      Effort: Pulmonary effort is normal.      Breath sounds: Normal breath sounds. No wheezing. No rhonchi. No rales.   Cardiovascular:      Tachycardia present. Regular rhythm.      Murmurs: There is no murmur.   Pulses:     Intact distal pulses.   Abdominal:      General: Bowel sounds are normal. There is no distension.      Palpations: Abdomen is soft.      Tenderness: There is no abdominal tenderness.   Musculoskeletal: Normal range of motion.      Cervical back: Normal range of motion and neck supple. Skin:     General: Skin is warm and dry.      Coloration: Skin is mottled.   Neurological:      Mental Status: Alert and oriented to person, place and time.   Psychiatric:         Attention and Perception: Attention normal.         Mood and Affect: Mood normal.         Speech: Speech normal.         Behavior: Behavior is cooperative.         Assessment and Plan      Assessment:  1. Pleuropericarditis    2. Pericardial effusion    3. Rheumatoid arthritis, involving unspecified site, unspecified whether rheumatoid factor present (HCC)    4. Vasculitis (HCC)    5. Iron deficiency anemia, unspecified iron deficiency anemia type    6. MICHAEL (obstructive sleep apnea)    7. Chest pain on breathing         1. Chest pain: She presented to the ER with chest pain in December 2022; pericardium not well visualized on echocardiogram but EKG showed ST elevation and NE depression consistent with acute pericarditis.  A small circumferential pericardial effusion was noted on CTA chest.  She was discharged but symptoms began to return in January 2023; she was readmitted to [facility] where echocardiogram showed no pericardial effusion but CTA chest showed small bilateral pleural effusions and nonspecific thickening of the pericardium; she was treated with steroids.  Her chest pain continues and is associated with back pain; both are made worse when laying flat.  Limited echo on January 11, 2023 showed a small pericardial effusion with no evidence of tamponade.  She was started on 650 mg aspirin 3 times daily and 0.6 mg colchicine twice daily.  She also received 500 cc of IV fluids.  She felt better after the IV fluids but symptoms have returned today; she does think the chest pain is better.  2. Acute pericarditis: This is likely related to her rheumatoid arthritis or reaction to Rituxan for polyarteritis nodosa.  January 2023 CTA chest showed small bilateral pleural effusions.  3. Pericardial effusion: Limited echocardiogram from January 11, 2023 showed a small, less than 1 cm, pericardial effusion with no evidence of tamponade.  She has been treated with aspirin and colchicine.  4. 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 Ivette Matias.  5. Iron deficiency anemia: Her hemoglobin was improved at last check.  She has chronic issues with  anemia.  6. Obstructive sleep apnea: She is compliant with CPAP.    Ms. Chicas is a patient who has been evaluated by Dr. Queen with acute pericarditis either likely due to rheumatoid arthritis or reaction to Rituxan.  CTA chest in January 2023 showed small bilateral pleural effusions and thickening of the pericardium; she was treated with steroids at [facility].  Limited echocardiogram from January 11, 2023 showed a small pericardial effusion with no evidence of tamponade.  She has been treated with 650 mg aspirin 3 times daily and 0.6 mg colchicine twice daily; her pain is slowly improving.  She felt better after receiving 500 cc of IV fluids on January 11; we will try to repeat those today in the ACU here at [place].  I will see her back in the office one 1 week but have asked her to notify office early next week if she feels she needs a repeat of IV fluids.    No follow-ups on file.  Orders Placed This Encounter   Procedures   • Ambulatory Referral to ACU For Infusion Treatment   • ECG 12 Lead      No orders of the defined types were placed in this encounter.        Thank you for the opportunity to participate in this patient's care.    EDWINA Em    This office note has been dictated.

## 2023-01-18 ENCOUNTER — TELEPHONE (OUTPATIENT)
Dept: CARDIOLOGY | Facility: CLINIC | Age: 48
End: 2023-01-18
Payer: COMMERCIAL

## 2023-01-18 NOTE — TELEPHONE ENCOUNTER
I did I await CTA chest images done 1/3/2023 at [facility].  Coronary arteries are normal, pericardial effusion, concentric, small noted.

## 2023-01-19 ENCOUNTER — HOSPITAL ENCOUNTER (OUTPATIENT)
Dept: CARDIOLOGY | Facility: HOSPITAL | Age: 48
Discharge: HOME | End: 2023-01-19
Admitting: NURSE PRACTITIONER
Payer: COMMERCIAL

## 2023-01-19 ENCOUNTER — OFFICE VISIT (OUTPATIENT)
Dept: CARDIOLOGY | Facility: CLINIC | Age: 48
End: 2023-01-19
Payer: COMMERCIAL

## 2023-01-19 VITALS
BODY MASS INDEX: 38.27 KG/M2 | HEIGHT: 63 IN | WEIGHT: 216 LBS | HEART RATE: 95 BPM | DIASTOLIC BLOOD PRESSURE: 86 MMHG | SYSTOLIC BLOOD PRESSURE: 140 MMHG

## 2023-01-19 DIAGNOSIS — R07.89 CHEST DISCOMFORT: Primary | ICD-10-CM

## 2023-01-19 DIAGNOSIS — I31.39 PERICARDIAL EFFUSION: ICD-10-CM

## 2023-01-19 DIAGNOSIS — I77.6 VASCULITIS: ICD-10-CM

## 2023-01-19 DIAGNOSIS — I31.9 PLEUROPERICARDITIS: ICD-10-CM

## 2023-01-19 DIAGNOSIS — D50.9 IRON DEFICIENCY ANEMIA, UNSPECIFIED IRON DEFICIENCY ANEMIA TYPE: ICD-10-CM

## 2023-01-19 PROCEDURE — 96376 TX/PRO/DX INJ SAME DRUG ADON: CPT

## 2023-01-19 PROCEDURE — 93000 ELECTROCARDIOGRAM COMPLETE: CPT | Performed by: NURSE PRACTITIONER

## 2023-01-19 PROCEDURE — 99214 OFFICE O/P EST MOD 30 MIN: CPT | Performed by: NURSE PRACTITIONER

## 2023-01-19 PROCEDURE — 36415 COLL VENOUS BLD VENIPUNCTURE: CPT

## 2023-01-19 RX ORDER — MULTIVIT WITH MINERALS/LUTEIN
TABLET ORAL DAILY
COMMUNITY
Start: 2020-11-27

## 2023-01-19 RX ORDER — HYDROCODONE BITARTRATE AND ACETAMINOPHEN 7.5; 325 MG/1; MG/1
1 TABLET ORAL AS NEEDED
COMMUNITY
Start: 2023-01-10

## 2023-01-19 RX ORDER — SODIUM CHLORIDE 9 MG/ML
500 INJECTION, SOLUTION INTRAVENOUS ONCE
Status: COMPLETED | OUTPATIENT
Start: 2023-01-19 | End: 2023-01-19

## 2023-01-19 RX ORDER — CETIRIZINE HYDROCHLORIDE 10 MG/1
10 TABLET ORAL DAILY
COMMUNITY

## 2023-01-19 RX ADMIN — SODIUM CHLORIDE 500 ML: 0.9 INJECTION, SOLUTION INTRAVENOUS at 13:30

## 2023-01-19 NOTE — PROGRESS NOTES
"      [facility] CARDIOLOGY  [address]  [facility] [address]  [facility] [address]  Phone: [phone]  Patient Name: Lauren Chicas  :1975  Age: 47 y.o.  Primary Cardiologist: Liza Queen MD  Encounter Provider:  EDWINA Brown    History of Present Illness     Lauren Chicas is a 47 y.o.  female whose medical history includes rheumatoid arthritis, and obstructive sleep apnea.  She is followed in our office by Dr. Queen for pericarditis associated with rheumatoid arthritis. I have reviewed the past medical records in preparation of today's visit.     23 Follow-up:  She is here for 1 week follow-up.  Overall she is feeling better but still having good days and bad days.  She feels best after receiving IV fluids; this seems to last about 2 days and then she starts to slowly decline.  She had palpitations 2 nights ago and this was followed by fatigue for most of the following day.  She is feeling somewhat better today.  She has moved from sleeping in the recliner to sleep and in bed.  She still has some pain on the right side and pain with deep inspiration.  She has not been working; she works as an  at [employer].  She feels that she would be able to work full-time from home.  She continues to take 650 mg aspirin 3 times daily and 0.6 mg colchicine daily.    Data Review     The following data was reviewed by EDWINA Brown on 23:    Vital Signs:   /86   Pulse 95   Ht 160 cm (63\")   Wt 98 kg (216 lb)   BMI 38.26 kg/m²       Weight:  Wt Readings from Last 3 Encounters:   23 98 kg (216 lb)   23 95.3 kg (210 lb)   23 97.1 kg (214 lb)     Body mass index is 38.26 kg/m².    Below is a summary of pertinent cardiology findings:  • She is , has 2 children, works as an , has never smoked, uses no alcohol, has 2 cups of coffee daily, and uses no drugs.  Family history " includes her mother and father with hypertension in her maternal grandmother with diabetes.  • History of rheumatoid arthritis which has been difficult to manage; she follows with Dr. Ivette Matias.  She also has polyarteritis nodosa and is on Rituxan per Dr. Matias.  She developed severe chest pain 1 week after her second dose of Rituxan in December 2022.  This was also associated with a feeling of her heart racing and short windedness.  • December 2022 admitted to [facility] for chest pain, and short windedness.  Echocardiogram showed EF 63%, mild mitral tricuspid insufficiencies, normal RVSP, grade 1 diastolic dysfunction, and the pericardium was not well visualized.  Her EKG showed ST elevation and NH depression consistent with acute pericarditis.  • December 2022 CTA chest showed no evidence of pulmonary as well as, aortic dissection, no pleural effusions but a small circumferential pericardial fluid, calcified area in the mid LAD.  • January 2023 started to feel worse again presented to the emergency room at [facility].  Hemoglobin was 7.8, ESR was elevated, free kappa and lambda light chains were both elevated though ratio normal, protein electrophoresis showed low albumin and increased alpha-1 globulins, and AIDEE showed elevated IgG, IgA, and IgM.  • January 2023 echocardiogram at [facility] showed EF 59% and no pericardial effusion.  CTA chest showed by axillary lymphadenopathy, no evidence of PE or aortic dissection, small bilateral pleural effusions, nonspecific mild thickening of the pericardium.  She had lymph node biopsy and was treated with steroids.  • January 2023 Limited echocardiogram shows EF 60.5%, normal RVSP, a small (less than 1 cm) pericardial effusion but no evidence of tamponade.    Labs:  • 01/06/2023:  cr 0.5, K 4.0, otherwise unremarkable CMP, Hgb 9.0, Plt 426  • 01/19/23 no new labs to review.      ECG 12 Lead    Date/Time: 1/19/2023 12:50 PM  Performed by:  Lucy Singh APRN  Authorized by: Lucy Singh, EDWINA   Comparison: compared with previous ECG from 1/13/2023  Similar to previous ECG  Rhythm: sinus rhythm  Rate: normal  BPM: 95  T flattening: all  Other findings: T wave abnormality    Clinical impression: non-specific ECG            Medications     Allergies as of 01/19/2023 - Reviewed 01/19/2023   Allergen Reaction Noted   • Sulfa antibiotics Swelling and Anaphylaxis 09/19/2012         Current Outpatient Medications:   •  ascorbic acid (VITAMIN C) 1000 MG tablet, Daily., Disp: , Rfl:   •  aspirin 325 MG tablet, Take 2 tablets by mouth 3 (Three) Times a Day., Disp: 180 tablet, Rfl: 3  •  BIOTIN PO, Take  by mouth 2 (Two) Times a Day., Disp: , Rfl:   •  calcium citrate-vitamin d (CITRACAL) 200-250 MG-UNIT tablet tablet, Take  by mouth Daily., Disp: , Rfl:   •  cetirizine (zyrTEC) 10 MG tablet, Take 10 mg by mouth Daily., Disp: , Rfl:   •  colchicine 0.6 MG tablet, Take 1 tablet by mouth 2 (Two) Times a Day., Disp: 60 tablet, Rfl: 3  •  dextromethorphan-guaifenesin (MUCINEX DM)  MG per 12 hr tablet, Take 1 tablet by mouth Daily., Disp: , Rfl:   •  FEVERFEW PO, Take  by mouth Daily., Disp: , Rfl:   •  folic acid (FOLVITE) 1 MG tablet, Take 1 mg by mouth 2 (Two) Times a Week., Disp: , Rfl:   •  HYDROcodone-acetaminophen (NORCO) 7.5-325 MG per tablet, Take 1 tablet by mouth As Needed., Disp: , Rfl:   •  hydroxychloroquine (PLAQUENIL) 200 MG tablet, Take  by mouth 2 (Two) Times a Day. Unsure of dosage, Disp: , Rfl:   •  Methotrexate Sodium 50 MG/2ML injection, 1 (One) Time Per Week. Last dose 12/18/22, Disp: , Rfl:   •  Multiple Vitamins-Minerals (MULTIVITAMIN PO), Take 1 tablet by mouth Daily., Disp: , Rfl:   •  Omega-3 Fatty Acids (FISH OIL PO), Take  by mouth Daily., Disp: , Rfl:   •  predniSONE (DELTASONE) 5 MG tablet, Take As Directed., Disp: , Rfl:   •  riTUXimab (RITUXAN) 10 MG/ML solution injection, Infuse  into a venous  catheter. As directed- every 4 months, Disp: , Rfl:   No current facility-administered medications for this visit.     Past History, Review of Systems, Exam     Past Medical History:   Diagnosis Date   • Anemia    • Rheumatoid arthritis (HCC)    • Vasculitis (HCC)        Past Surgical History:   has a past surgical history that includes LASIK; Colonoscopy (N/A, 10/14/2022); and Leg Biopsy.     Social History     Socioeconomic History   • Marital status:    • Number of children: 2   Tobacco Use   • Smoking status: Never   • Smokeless tobacco: Never   Vaping Use   • Vaping Use: Never used   Substance and Sexual Activity   • Alcohol use: No     Comment: Caffeine use: 2 cups daily   • Drug use: No   • Sexual activity: Yes     Partners: Male     Birth control/protection: Vasectomy       Review of Systems   Constitutional: Positive for malaise/fatigue.   Cardiovascular: Positive for chest pain. Negative for claudication, cyanosis, dyspnea on exertion, irregular heartbeat, leg swelling, near-syncope, orthopnea, palpitations and syncope.   Respiratory: Positive for shortness of breath.    Skin: Positive for rash.   Musculoskeletal: Positive for back pain.       Vitals reviewed.   Constitutional:       Appearance: Not in distress.   Eyes:      Conjunctiva/sclera: Conjunctivae normal.      Pupils: Pupils are equal, round, and reactive to light.   HENT:      Head: Normocephalic.      Nose: Nose normal.    Mouth/Throat:      Pharynx: Oropharynx is clear.   Neck:      Vascular: JVD normal.   Pulmonary:      Effort: Pulmonary effort is normal.      Breath sounds: Normal breath sounds. No wheezing. No rhonchi. No rales.   Cardiovascular:      Normal rate. Regular rhythm.      Murmurs: There is no murmur.   Pulses:     Intact distal pulses.   Abdominal:      General: Bowel sounds are normal. There is no distension.      Palpations: Abdomen is soft.      Tenderness: There is no abdominal tenderness.   Musculoskeletal: Normal  range of motion.      Cervical back: Normal range of motion and neck supple. Skin:     General: Skin is warm and dry.   Neurological:      Mental Status: Alert and oriented to person, place and time.   Psychiatric:         Attention and Perception: Attention normal.         Mood and Affect: Mood normal.         Speech: Speech normal.         Behavior: Behavior is cooperative.         Assessment and Plan     Assessment:  1. Chest discomfort    2. Pleuropericarditis    3. Pericardial effusion    4. Vasculitis (HCC)    5. Iron deficiency anemia, unspecified iron deficiency anemia type         1. Chest pain: She presented to the ER with chest pain in December 2022; pericardium not well visualized on echocardiogram but EKG showed ST elevation and RI depression consistent with acute pericarditis.  A small circumferential pericardial effusion was noted on CTA chest.  She was discharged but symptoms began to return in January 2023; she was readmitted to [facility] where echocardiogram showed no pericardial effusion but CTA chest showed small bilateral pleural effusions and nonspecific thickening of the pericardium; she was treated with steroids.  Her chest pain continues and is associated with back pain; both are made worse when laying flat.  Limited echo on January 11, 2023 showed a small pericardial effusion with no evidence of tamponade.  She was started on 650 mg aspirin 3 times daily and 0.6 mg colchicine twice daily.  Dr. Queen reviewed the CD of images of testing she had done at [facility]; it also shows a circumferential pericardial effusion.  She is slowly improving and feels like she would be able to work from home.  The IV fluids seem to help for a few days.  2. Acute pericarditis: This is likely related to her rheumatoid arthritis or reaction to Rituxan for polyarteritis nodosa.  January 2023 CTA chest showed small bilateral pleural effusions.  Slowly improving.  3. Pericardial effusion: Limited  echocardiogram from January 11, 2023 showed a small, less than 1 cm, pericardial effusion with no evidence of tamponade.  She has been treated with aspirin and colchicine.  Her pain is slowly improving.  4. 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 Ivette Matias.  5. Iron deficiency anemia: Her hemoglobin was improved at last check.  She has chronic issues with anemia.  6. Obstructive sleep apnea: She is compliant with CPAP.    Ms. Chicas is a patient who has been evaluated by Dr. Queen with acute pericarditis either likely due to rheumatoid arthritis or reaction to Rituxan.  CTA chest in January 2023 showed small bilateral pleural effusions and thickening of the pericardium; she was treated with steroids at [facility].  Limited echocardiogram from January 11, 2023 showed a small pericardial effusion with no evidence of tamponade.  She has been treated with 650 mg aspirin 3 times daily and 0.6 mg colchicine twice daily; her pain is slowly improving.  She felt better after receiving 500 cc of IV fluids on January 11 and January 13, 2023.  We will give her another 500 cc of IV fluids today in the [facility].  She will call if she feels like she needs to have this repeated based on this we may decide to have her scheduled for regular IV fluids in the [facility].  For now we will plan on seeing her back in 2 weeks though I cautioned her to call us if she has any worsening symptoms.  I discussed her symptoms with Dr. Queen and we agree that it would be fine for her to work full-time from home.    No follow-ups on file.  Orders Placed This Encounter   Procedures   • ECG 12 Lead      No orders of the defined types were placed in this encounter.        Thank you for the opportunity to participate in this patient's care.    EDWINA Em    This office note has been dictated.

## 2023-01-26 ENCOUNTER — TELEPHONE (OUTPATIENT)
Dept: CARDIOLOGY | Facility: CLINIC | Age: 48
End: 2023-01-26
Payer: COMMERCIAL

## 2023-01-26 ENCOUNTER — HOSPITAL ENCOUNTER (OUTPATIENT)
Dept: CARDIOLOGY | Facility: HOSPITAL | Age: 48
Discharge: HOME | End: 2023-01-26
Payer: COMMERCIAL

## 2023-01-26 DIAGNOSIS — I31.9 PLEUROPERICARDITIS: Primary | ICD-10-CM

## 2023-01-26 RX ORDER — SODIUM CHLORIDE 9 MG/ML
250 INJECTION, SOLUTION INTRAVENOUS ONCE
Status: COMPLETED | OUTPATIENT
Start: 2023-01-26 | End: 2023-01-27

## 2023-01-26 NOTE — TELEPHONE ENCOUNTER
I spoke with Dr. Queen. We do want her to get IV fluids tomorrow. I will touch base with her on Monday to see how she's feeling. We think she may need to get IV fluids more often. If she doesn't feel better after fluids tomorrow, we will adjust her therapy then.     Thanks!  EDWINA Em

## 2023-01-26 NOTE — TELEPHONE ENCOUNTER
Ms. Chicas is going to come in for IV fluids tomorrow. She has hit a plateau and maybe doesn't feel as good this week.   She's still on 0.6 mg colchicine BID and 650 mg aspirin TID.     Thanks!  EDWINA Em

## 2023-01-26 NOTE — TELEPHONE ENCOUNTER
Called and spoke with patient.  She will be here at 0900 or so for IVF.  Let her know MM will call her on Monday to see how she is feeling.      I made [facility] aware of the plan.    Kim Salvador RN  [facility] Triage  01/26/23 13:41 EST

## 2023-01-26 NOTE — TELEPHONE ENCOUNTER
Pt called this am and said that she is feeling bad again and she is  wanting IV fluids again please.  Thanks KL                     She has been treated with 650 mg aspirin 3 times daily and 0.6 mg colchicine twice daily; her pain is slowly improving.  She felt better after receiving 500 cc of IV fluids on January 11 and January 13, 2023.  We will give her another 500 cc of IV fluids today in the [facility].  She will call if she feels like she needs to have this repeated based on this we may decide to have her scheduled for regular IV fluids in the [facility].  For now we will plan on seeing her back in 2 weeks though I cautioned her to call us if she has any worsening symptoms.  I discussed her symptoms with Dr. Queen and we agree that it would be fine for her to work full-time from home.

## 2023-01-26 NOTE — TELEPHONE ENCOUNTER
Please call her and tell her that she can come into [facility] now for IV fluids. They are expecting her.     Thanks!  EDWINA Em

## 2023-01-26 NOTE — TELEPHONE ENCOUNTER
I called and spoke with patient.  She can't come to [facility] today because she does not want to drive herself, she gets fatigued and winded.  Her  will be able to bring her for IVF tomorrow (Friday).    She did let know she does not feel like she is progressing at all after being on asa and colchicine.  She actually reports feeling worse this week.  Still with c/o back and chest pain and feeling like her breathing is labored.     She had mentioned her RA doctor could do an infusion for her but she is not exactly sure what it is.  Her RA MD is Dr. Ivette Matias.      I spoke with MM in the office about this patient.  She is going to speak to Dr. Queen about the plan of care and then call the patient later today.  We will tentatively plan to bring the patient to [facility] for fluids tomorrow.    [facility] updated that patient is not coming today, but possibly tomorrow.    Kim Salvador RN  [facility] Triage  01/26/23 09:39 EST'    Callback # [phone]

## 2023-01-27 ENCOUNTER — HOSPITAL ENCOUNTER (OUTPATIENT)
Dept: CARDIOLOGY | Facility: HOSPITAL | Age: 48
Discharge: HOME | End: 2023-01-27
Admitting: NURSE PRACTITIONER
Payer: COMMERCIAL

## 2023-01-27 PROCEDURE — 36415 COLL VENOUS BLD VENIPUNCTURE: CPT

## 2023-01-27 PROCEDURE — 96376 TX/PRO/DX INJ SAME DRUG ADON: CPT

## 2023-01-27 RX ADMIN — SODIUM CHLORIDE 250 ML/HR: 9 INJECTION, SOLUTION INTRAVENOUS at 09:26

## 2023-01-31 ENCOUNTER — TELEPHONE (OUTPATIENT)
Dept: CARDIOLOGY | Facility: CLINIC | Age: 48
End: 2023-01-31

## 2023-01-31 NOTE — TELEPHONE ENCOUNTER
Caller: Lauren Chicas    Relationship to patient: Self    Best call back number: [phone]    Patient is needing: PATIENT IS SCHEDULED TO COME IN ON 2.2.23. SHE WAS CHECKING TO MAKE SURE SHE IS STILL GETTING HER IV FLUIDS ON HER VISIT.

## 2023-02-02 ENCOUNTER — OFFICE VISIT (OUTPATIENT)
Dept: CARDIOLOGY | Facility: CLINIC | Age: 48
End: 2023-02-02
Payer: COMMERCIAL

## 2023-02-02 ENCOUNTER — HOSPITAL ENCOUNTER (OUTPATIENT)
Dept: CARDIOLOGY | Facility: HOSPITAL | Age: 48
Discharge: HOME | End: 2023-02-02
Admitting: NURSE PRACTITIONER
Payer: COMMERCIAL

## 2023-02-02 VITALS
HEART RATE: 86 BPM | DIASTOLIC BLOOD PRESSURE: 74 MMHG | BODY MASS INDEX: 38.98 KG/M2 | HEIGHT: 63 IN | SYSTOLIC BLOOD PRESSURE: 132 MMHG | WEIGHT: 220 LBS

## 2023-02-02 DIAGNOSIS — I31.39 PERICARDIAL EFFUSION: ICD-10-CM

## 2023-02-02 DIAGNOSIS — I31.9 PLEUROPERICARDITIS: Primary | ICD-10-CM

## 2023-02-02 DIAGNOSIS — R07.89 CHEST DISCOMFORT: ICD-10-CM

## 2023-02-02 PROCEDURE — 93000 ELECTROCARDIOGRAM COMPLETE: CPT | Performed by: NURSE PRACTITIONER

## 2023-02-02 PROCEDURE — 36415 COLL VENOUS BLD VENIPUNCTURE: CPT

## 2023-02-02 PROCEDURE — 99214 OFFICE O/P EST MOD 30 MIN: CPT | Performed by: NURSE PRACTITIONER

## 2023-02-02 RX ORDER — SODIUM CHLORIDE 9 MG/ML
1000 INJECTION, SOLUTION INTRAVENOUS ONCE
Status: COMPLETED | OUTPATIENT
Start: 2023-02-02 | End: 2023-02-02

## 2023-02-02 RX ORDER — SODIUM CHLORIDE 9 MG/ML
500 INJECTION, SOLUTION INTRAVENOUS ONCE
Status: DISCONTINUED | OUTPATIENT
Start: 2023-02-02 | End: 2023-02-02

## 2023-02-02 RX ORDER — SODIUM CHLORIDE 9 MG/ML
500 INJECTION, SOLUTION INTRAVENOUS WEEKLY
OUTPATIENT
Start: 2023-02-11

## 2023-02-02 RX ADMIN — SODIUM CHLORIDE 1000 ML/HR: 9 INJECTION, SOLUTION INTRAVENOUS at 13:11

## 2023-02-02 NOTE — PROGRESS NOTES
"      [facility] CARDIOLOGY  [address] [facility] [address]  [facility] [address]  Phone: [phone]  Patient Name: Lauren Chicas  :1975  Age: 47 y.o.  Primary Cardiologist: Liza Queen MD  Encounter Provider:  EDWINA Brown    History of Present Illness     Lauren Chicas is a 47 y.o.  female whose medical history includes rheumatoid arthritis, and obstructive sleep apnea.  She is followed in our office by Dr. Queen for pericarditis associated with rheumatoid arthritis. I have reviewed the past medical records in preparation of today's visit.     23 Follow-up:  She is here for 2-week follow-up.  She called the office last week feeling like she was not getting any better; a repeat EKG was performed which was stable.  Dr. Queen and I felt that she needed to receive IV fluids more frequently and she was agreeable to this.  She states that she feels much better after receiving IV fluids but this only last for about 4 days.  After the fourth day she starts to have back pain that radiates to her chest.  She also continues to have pain when she lays down at night and with deep inspiration.  She has taken about 4 hydrocodone over the past week which helps her sleep at night.  Given all this she does feel that she is having a better week and is starting to improve.  She has had no palpitations, no dyspnea, or no lightheadedness.  She is agreeable to being scheduled for weekly the IV fluids.    Data Review     The following data was reviewed by EDWINA Brown on 23:    Vital Signs:   /74   Pulse 86   Ht 160 cm (63\")   Wt 99.8 kg (220 lb)   BMI 38.97 kg/m²       Weight:  Wt Readings from Last 3 Encounters:   23 99.8 kg (220 lb)   23 98 kg (216 lb)   23 95.3 kg (210 lb)     Body mass index is 38.97 kg/m².    Below is a summary of pertinent cardiology findings:  • She is , has 2 children, works as an " , has never smoked, uses no alcohol, has 2 cups of coffee daily, and uses no drugs.  Family history includes her mother and father with hypertension in her maternal grandmother with diabetes.  • History of rheumatoid arthritis which has been difficult to manage; she follows with Dr. Ivette Matias.  She also has polyarteritis nodosa and is on Rituxan per Dr. Matias.  She developed severe chest pain 1 week after her second dose of Rituxan in December 2022.  This was also associated with a feeling of her heart racing and short windedness.  • December 2022 admitted to [facility] for chest pain, and short windedness.  Echocardiogram showed EF 63%, mild mitral tricuspid insufficiencies, normal RVSP, grade 1 diastolic dysfunction, and the pericardium was not well visualized.  Her EKG showed ST elevation and MT depression consistent with acute pericarditis.  • December 2022 CTA chest showed no evidence of pulmonary as well as, aortic dissection, no pleural effusions but a small circumferential pericardial fluid, calcified area in the mid LAD.  • January 2023 started to feel worse again presented to the emergency room at [facility].  Hemoglobin was 7.8, ESR was elevated, free kappa and lambda light chains were both elevated though ratio normal, protein electrophoresis showed low albumin and increased alpha-1 globulins, and AIDEE showed elevated IgG, IgA, and IgM.  • January 2023 echocardiogram at [facility] showed EF 59% and no pericardial effusion.  CTA chest showed by axillary lymphadenopathy, no evidence of PE or aortic dissection, small bilateral pleural effusions, nonspecific mild thickening of the pericardium.  She had lymph node biopsy and was treated with steroids.  • January 2023 Limited echocardiogram shows EF 60.5%, normal RVSP, a small (less than 1 cm) pericardial effusion but no evidence of tamponade.    Labs:  • 01/06/2023:  cr 0.5, K 4.0, otherwise unremarkable CMP,  Hgb 9.0, Plt 426  • 02/02/23 no new labs to review.      ECG 12 Lead    Date/Time: 2/2/2023 12:48 PM  Performed by: Lucy Singh APRN  Authorized by: Lucy Singh APRN   Comparison: compared with previous ECG from 1/19/2023  Similar to previous ECG  Rhythm: sinus rhythm  Rate: normal  BPM: 86  T flattening: III, V2, V3 and V4    Clinical impression: non-specific ECG            Medications     Allergies as of 02/02/2023 - Reviewed 02/02/2023   Allergen Reaction Noted   • Sulfa antibiotics Swelling and Anaphylaxis 09/19/2012         Current Outpatient Medications:   •  ascorbic acid (VITAMIN C) 1000 MG tablet, Daily., Disp: , Rfl:   •  aspirin 325 MG tablet, Take 2 tablets by mouth 3 (Three) Times a Day., Disp: 180 tablet, Rfl: 3  •  BIOTIN PO, Take  by mouth 2 (Two) Times a Day., Disp: , Rfl:   •  calcium citrate-vitamin d (CITRACAL) 200-250 MG-UNIT tablet tablet, Take  by mouth Daily., Disp: , Rfl:   •  cetirizine (zyrTEC) 10 MG tablet, Take 10 mg by mouth Daily., Disp: , Rfl:   •  colchicine 0.6 MG tablet, Take 1 tablet by mouth 2 (Two) Times a Day., Disp: 60 tablet, Rfl: 3  •  dextromethorphan-guaifenesin (MUCINEX DM)  MG per 12 hr tablet, Take 1 tablet by mouth Daily., Disp: , Rfl:   •  FEVERFEW PO, Take  by mouth Daily., Disp: , Rfl:   •  folic acid (FOLVITE) 1 MG tablet, Take 1 mg by mouth 2 (Two) Times a Week., Disp: , Rfl:   •  HYDROcodone-acetaminophen (NORCO) 7.5-325 MG per tablet, Take 1 tablet by mouth As Needed., Disp: , Rfl:   •  hydroxychloroquine (PLAQUENIL) 200 MG tablet, Take  by mouth 2 (Two) Times a Day. Unsure of dosage, Disp: , Rfl:   •  Methotrexate Sodium 50 MG/2ML injection, 1 (One) Time Per Week. Last dose 12/18/22, Disp: , Rfl:   •  Multiple Vitamins-Minerals (MULTIVITAMIN PO), Take 1 tablet by mouth Daily., Disp: , Rfl:   •  Omega-3 Fatty Acids (FISH OIL PO), Take  by mouth Daily., Disp: , Rfl:   •  predniSONE (DELTASONE) 5 MG tablet, Take As Directed.,  Disp: , Rfl:   •  riTUXimab (RITUXAN) 10 MG/ML solution injection, Infuse  into a venous catheter. As directed- every 4 months, Disp: , Rfl:   No current facility-administered medications for this visit.     Past History, Review of Systems, Exam     Past Medical History:   Diagnosis Date   • Anemia    • Rheumatoid arthritis (HCC)    • Vasculitis (HCC)        Past Surgical History:   has a past surgical history that includes LASIK; Colonoscopy (N/A, 10/14/2022); and Leg Biopsy.     Social History     Socioeconomic History   • Marital status:    • Number of children: 2   Tobacco Use   • Smoking status: Never   • Smokeless tobacco: Never   Vaping Use   • Vaping Use: Never used   Substance and Sexual Activity   • Alcohol use: No     Comment: Caffeine use: 2 cups daily   • Drug use: No   • Sexual activity: Yes     Partners: Male     Birth control/protection: Vasectomy       Review of Systems   Constitutional: Positive for malaise/fatigue.   Cardiovascular: Positive for chest pain. Negative for claudication, cyanosis, dyspnea on exertion, irregular heartbeat, leg swelling, near-syncope, orthopnea, palpitations, paroxysmal nocturnal dyspnea and syncope.   Respiratory: Positive for shortness of breath.    Skin: Positive for rash.   Musculoskeletal: Positive for back pain.       Vitals reviewed.   Constitutional:       Appearance: Not in distress.   Eyes:      Conjunctiva/sclera: Conjunctivae normal.      Pupils: Pupils are equal, round, and reactive to light.   HENT:      Head: Normocephalic.      Nose: Nose normal.    Mouth/Throat:      Pharynx: Oropharynx is clear.   Neck:      Vascular: JVD normal.   Pulmonary:      Effort: Pulmonary effort is normal.      Breath sounds: Normal breath sounds. No wheezing. No rhonchi. No rales.   Cardiovascular:      Normal rate. Regular rhythm.      Murmurs: There is no murmur.   Pulses:     Intact distal pulses.   Edema:     Peripheral edema absent.   Abdominal:      General:  Bowel sounds are normal. There is no distension.      Palpations: Abdomen is soft.      Tenderness: There is no abdominal tenderness.   Musculoskeletal: Normal range of motion.      Cervical back: Normal range of motion and neck supple. Skin:     General: Skin is warm and dry.   Neurological:      Mental Status: Alert and oriented to person, place and time.   Psychiatric:         Attention and Perception: Attention normal.         Mood and Affect: Mood normal.         Speech: Speech normal.         Behavior: Behavior is cooperative.         Assessment and Plan     Assessment:  1. Pleuropericarditis    2. Pericardial effusion    3. Chest discomfort         1. Chest pain: She presented to the ER with chest pain in December 2022; pericardium not well visualized on echocardiogram but EKG showed ST elevation and PA depression consistent with acute pericarditis.  A small circumferential pericardial effusion was noted on CTA chest.  She was discharged but symptoms began to return in January 2023; she was readmitted to [facility] where echocardiogram showed no pericardial effusion but CTA chest showed small bilateral pleural effusions and nonspecific thickening of the pericardium; she was treated with steroids.  Her chest pain continues and is associated with back pain; both are made worse when laying flat.  Limited echo on January 11, 2023 showed a small pericardial effusion with no evidence of tamponade.  She was started on 650 mg aspirin 3 times daily and 0.6 mg colchicine twice daily.  Dr. Queen reviewed the CD of images of testing she had done at [facility]; it also shows a circumferential pericardial effusion.  She continues to improve.  Her pain is improved after receiving IV fluids and she is agreeable to receiving weekly IV fluid infusion.  2. Acute pericarditis: This is likely related to her rheumatoid arthritis or reaction to Rituxan for polyarteritis nodosa.  January 2023 CTA chest showed small  bilateral pleural effusions.  Slowly improving; she feels better this week.  3. Pericardial effusion: Limited echocardiogram from January 11, 2023 showed a small, less than 1 cm, pericardial effusion with no evidence of tamponade.  She has been treated with aspirin and colchicine.  She is feeling better this week.  4. 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 Ivette Matias.  5. Iron deficiency anemia: Her hemoglobin was improved at last check.  She has chronic issues with anemia.  6. Obstructive sleep apnea: She is compliant with CPAP.    Ms. Chicas is a patient who has been evaluated by Dr. Queen with acute pericarditis either likely due to rheumatoid arthritis or reaction to Rituxan.  CTA chest in January 2023 showed small bilateral pleural effusions and thickening of the pericardium; she was treated with steroids at [facility].  Limited echocardiogram from January 11, 2023 showed a small pericardial effusion with no evidence of tamponade.  She has been treated with 650 mg aspirin 3 times daily and 0.6 mg colchicine twice daily; her pain is slowly improving.  She felt better after receiving 500 cc of IV fluids intermittently.  For now I will arrange for her to get 1 L of IV fluids in the [facility] today.  I will also arrange for her to get weekly IV fluid infusion in the [facility].  I will see her back in 2 weeks for follow-up.    Return in about 2 weeks (around 2/16/2023) for Follow-up with EDWINA Em.  Orders Placed This Encounter   Procedures   • ECG 12 Lead      New Medications Ordered This Visit   Medications   • sodium chloride 0.9 % infusion         Thank you for the opportunity to participate in this patient's care.    EDWINA Em    This office note has been dictated.

## 2023-02-16 ENCOUNTER — HOSPITAL ENCOUNTER (OUTPATIENT)
Dept: CARDIOLOGY | Facility: HOSPITAL | Age: 48
Discharge: HOME | End: 2023-02-16
Admitting: NURSE PRACTITIONER
Payer: COMMERCIAL

## 2023-02-16 ENCOUNTER — OFFICE VISIT (OUTPATIENT)
Dept: CARDIOLOGY | Facility: CLINIC | Age: 48
End: 2023-02-16
Payer: COMMERCIAL

## 2023-02-16 VITALS
HEART RATE: 96 BPM | HEIGHT: 63 IN | SYSTOLIC BLOOD PRESSURE: 132 MMHG | BODY MASS INDEX: 38.09 KG/M2 | DIASTOLIC BLOOD PRESSURE: 78 MMHG | WEIGHT: 215 LBS

## 2023-02-16 DIAGNOSIS — R07.89 CHEST DISCOMFORT: ICD-10-CM

## 2023-02-16 DIAGNOSIS — I31.9 PLEUROPERICARDITIS: Primary | ICD-10-CM

## 2023-02-16 PROCEDURE — 96374 THER/PROPH/DIAG INJ IV PUSH: CPT

## 2023-02-16 PROCEDURE — 36415 COLL VENOUS BLD VENIPUNCTURE: CPT

## 2023-02-16 PROCEDURE — 99213 OFFICE O/P EST LOW 20 MIN: CPT | Performed by: NURSE PRACTITIONER

## 2023-02-16 PROCEDURE — 93000 ELECTROCARDIOGRAM COMPLETE: CPT | Performed by: NURSE PRACTITIONER

## 2023-02-16 RX ORDER — SODIUM CHLORIDE 9 MG/ML
1000 INJECTION, SOLUTION INTRAVENOUS ONCE
Status: COMPLETED | OUTPATIENT
Start: 2023-02-16 | End: 2023-02-16

## 2023-02-16 RX ORDER — ASPIRIN 325 MG
TABLET ORAL
Qty: 180 TABLET | Refills: 3
Start: 2023-02-16 | End: 2023-03-20

## 2023-02-16 RX ADMIN — SODIUM CHLORIDE 1000 ML/HR: 9 INJECTION, SOLUTION INTRAVENOUS at 15:34

## 2023-03-20 ENCOUNTER — OFFICE VISIT (OUTPATIENT)
Dept: CARDIOLOGY | Facility: CLINIC | Age: 48
End: 2023-03-20
Payer: COMMERCIAL

## 2023-03-20 VITALS
HEIGHT: 63 IN | BODY MASS INDEX: 38.62 KG/M2 | HEART RATE: 96 BPM | DIASTOLIC BLOOD PRESSURE: 76 MMHG | WEIGHT: 218 LBS | SYSTOLIC BLOOD PRESSURE: 128 MMHG

## 2023-03-20 DIAGNOSIS — I31.9 PLEUROPERICARDITIS: ICD-10-CM

## 2023-03-20 DIAGNOSIS — I31.39 PERICARDIAL EFFUSION: ICD-10-CM

## 2023-03-20 DIAGNOSIS — I77.6 VASCULITIS: ICD-10-CM

## 2023-03-20 DIAGNOSIS — R07.89 CHEST DISCOMFORT: Primary | ICD-10-CM

## 2023-03-20 PROCEDURE — 99214 OFFICE O/P EST MOD 30 MIN: CPT | Performed by: NURSE PRACTITIONER

## 2023-03-20 PROCEDURE — 93000 ELECTROCARDIOGRAM COMPLETE: CPT | Performed by: NURSE PRACTITIONER

## 2023-03-20 RX ORDER — COLCHICINE 0.6 MG/1
0.3 TABLET ORAL 2 TIMES DAILY
Qty: 30 TABLET | Refills: 3
Start: 2023-03-20

## 2023-03-20 RX ORDER — ASPIRIN 325 MG
325 TABLET ORAL DAILY
Qty: 90 TABLET | Refills: 3
Start: 2023-03-20

## 2023-03-20 NOTE — PROGRESS NOTES
"      [facility] CARDIOLOGY  [address] [facility] [address]  [facility] [address]  Phone: [phone]  Patient Name: Lauren Chicas  :1975  Age: 47 y.o.  Primary Cardiologist: Liza Queen MD  Encounter Provider:  EDWINA Brown    History of Present Illness     Lauren Chicas is a 47 y.o.  female whose medical history includes rheumatoid arthritis, and obstructive sleep apnea.  She is followed in our office by Dr. Queen for pericarditis associated with rheumatoid arthritis. I have reviewed the past medical records in preparation of today's visit.     23 Follow-up:  She is here for 4 week follow-up. At last visit, we started weaning her aspirin. She is tolerating this. She is walking more. She still has occasional back pain but it is tolerable; she doesn't feel like she needs IV fluids at this time. She recently traveled out of town and slept flat in a bed. This actually felt better than we she sleeps elevated at home. She denies syncope or palpitations.     Data Review     The following data was reviewed by EDWINA Brown on 23:    Vital Signs:   /76   Pulse 96   Ht 160 cm (63\")   Wt 98.9 kg (218 lb)   BMI 38.62 kg/m²       Weight:  Wt Readings from Last 3 Encounters:   23 98.9 kg (218 lb)   23 97.5 kg (215 lb)   23 99.8 kg (220 lb)     Body mass index is 38.62 kg/m².    Below is a summary of pertinent cardiology findings:  • She is , has 2 children, works as an , has never smoked, uses no alcohol, has 2 cups of coffee daily, and uses no drugs.  Family history includes her mother and father with hypertension in her maternal grandmother with diabetes.  • History of rheumatoid arthritis which has been difficult to manage; she follows with Dr. Ivette Matias.  She also has polyarteritis nodosa and is on Rituxan per Dr. Matias.  She developed severe chest pain 1 week after her " second dose of Rituxan in December 2022.  This was also associated with a feeling of her heart racing and short windedness.  • December 2022 admitted to [facility] for chest pain, and short windedness.  Echocardiogram showed EF 63%, mild mitral tricuspid insufficiencies, normal RVSP, grade 1 diastolic dysfunction, and the pericardium was not well visualized.  Her EKG showed ST elevation and ID depression consistent with acute pericarditis.  • December 2022 CTA chest showed no evidence of pulmonary as well as, aortic dissection, no pleural effusions but a small circumferential pericardial fluid, calcified area in the mid LAD.  • January 2023 started to feel worse again presented to the emergency room at [facility].  Hemoglobin was 7.8, ESR was elevated, free kappa and lambda light chains were both elevated though ratio normal, protein electrophoresis showed low albumin and increased alpha-1 globulins, and AIDEE showed elevated IgG, IgA, and IgM.  • January 2023 echocardiogram at [facility] showed EF 59% and no pericardial effusion.  CTA chest showed by axillary lymphadenopathy, no evidence of PE or aortic dissection, small bilateral pleural effusions, nonspecific mild thickening of the pericardium.  She had lymph node biopsy and was treated with steroids.  • January 2023 Limited echocardiogram shows EF 60.5%, normal RVSP, a small (less than 1 cm) pericardial effusion but no evidence of tamponade.    Labs:  • 01/06/2023:  cr 0.5, K 4.0, otherwise unremarkable CMP, Hgb 9.0, Plt 426  • 03/20/23 no new labs to review.      ECG 12 Lead    Date/Time: 3/20/2023 2:22 PM  Performed by: Lucy Singh APRN  Authorized by: Lucy Singh APRN   Comparison: compared with previous ECG from 2/16/2023  Similar to previous ECG  Rhythm: sinus rhythm  Rate: normal  BPM: 96  T flattening: III  Other findings: T wave abnormality    Clinical impression: non-specific  ECG            Medications     Allergies as of 03/20/2023 - Reviewed 03/20/2023   Allergen Reaction Noted   • Sulfa antibiotics Swelling and Anaphylaxis 09/19/2012         Current Outpatient Medications:   •  ascorbic acid (VITAMIN C) 1000 MG tablet, Daily., Disp: , Rfl:   •  aspirin 325 MG tablet, Take 1 tablet by mouth Daily. 650 mg QAM and midday; 325 mg QPM, Disp: 90 tablet, Rfl: 3  •  BIOTIN PO, Take  by mouth 2 (Two) Times a Day., Disp: , Rfl:   •  calcium citrate-vitamin d (CITRACAL) 200-250 MG-UNIT tablet tablet, Take  by mouth Daily., Disp: , Rfl:   •  cetirizine (zyrTEC) 10 MG tablet, Take 1 tablet by mouth Daily., Disp: , Rfl:   •  colchicine 0.6 MG tablet, Take 0.5 tablets by mouth 2 (Two) Times a Day., Disp: 30 tablet, Rfl: 3  •  dextromethorphan-guaifenesin (MUCINEX DM)  MG per 12 hr tablet, Take 1 tablet by mouth Daily., Disp: , Rfl:   •  FEVERFEW PO, Take  by mouth Daily., Disp: , Rfl:   •  folic acid (FOLVITE) 1 MG tablet, Take 1 tablet by mouth 2 (Two) Times a Week., Disp: , Rfl:   •  HYDROcodone-acetaminophen (NORCO) 7.5-325 MG per tablet, Take 1 tablet by mouth As Needed., Disp: , Rfl:   •  hydroxychloroquine (PLAQUENIL) 200 MG tablet, Take  by mouth 2 (Two) Times a Day. Unsure of dosage, Disp: , Rfl:   •  Methotrexate Sodium 50 MG/2ML injection, 1 (One) Time Per Week. Last dose 12/18/22, Disp: , Rfl:   •  Multiple Vitamins-Minerals (MULTIVITAMIN PO), Take 1 tablet by mouth Daily., Disp: , Rfl:   •  Omega-3 Fatty Acids (FISH OIL PO), Take  by mouth Daily., Disp: , Rfl:   •  predniSONE (DELTASONE) 5 MG tablet, Take As Directed., Disp: , Rfl:   •  riTUXimab (RITUXAN) 10 MG/ML solution injection, Infuse  into a venous catheter. As directed- every 4 months, Disp: , Rfl:      Past History, Review of Systems, Exam     Past Medical History:   Diagnosis Date   • Anemia    • Rheumatoid arthritis (HCC)    • Vasculitis (HCC)        Past Surgical History:   has a past surgical history that includes  LASIK; Colonoscopy (N/A, 10/14/2022); and Leg Biopsy.     Social History     Socioeconomic History   • Marital status:    • Number of children: 2   Tobacco Use   • Smoking status: Never   • Smokeless tobacco: Never   Vaping Use   • Vaping Use: Never used   Substance and Sexual Activity   • Alcohol use: No     Comment: Caffeine use: 2 cups daily   • Drug use: No   • Sexual activity: Yes     Partners: Male     Birth control/protection: Vasectomy       Review of Systems   Constitutional: Positive for malaise/fatigue.   Cardiovascular: Negative for chest pain, claudication, cyanosis, dyspnea on exertion, irregular heartbeat, leg swelling, near-syncope, orthopnea, palpitations, paroxysmal nocturnal dyspnea and syncope.   Respiratory: Negative for shortness of breath.    Skin: Positive for rash.   Musculoskeletal: Positive for back pain.       Vitals reviewed.   Constitutional:       Appearance: Not in distress.   Eyes:      Conjunctiva/sclera: Conjunctivae normal.      Pupils: Pupils are equal, round, and reactive to light.   HENT:      Head: Normocephalic.      Nose: Nose normal.    Mouth/Throat:      Pharynx: Oropharynx is clear.   Neck:      Vascular: JVD normal.   Pulmonary:      Effort: Pulmonary effort is normal.      Breath sounds: Normal breath sounds. No wheezing. No rhonchi. No rales.   Cardiovascular:      Normal rate. Regular rhythm.      Murmurs: There is no murmur.   Pulses:     Intact distal pulses.   Edema:     Peripheral edema absent.   Abdominal:      General: Bowel sounds are normal. There is no distension.      Palpations: Abdomen is soft.      Tenderness: There is no abdominal tenderness.   Musculoskeletal: Normal range of motion.      Cervical back: Normal range of motion and neck supple. Skin:     General: Skin is warm and dry.   Neurological:      Mental Status: Alert and oriented to person, place and time.   Psychiatric:         Attention and Perception: Attention normal.         Mood and  Affect: Mood normal.         Speech: Speech normal.         Behavior: Behavior is cooperative.         Assessment and Plan     Assessment:  1. Chest discomfort    2. Pleuropericarditis    3. Pericardial effusion    4. Vasculitis (HCC)         1. Chest pain: She presented to the ER with chest pain in December 2022; pericardium not well visualized on echocardiogram but EKG showed ST elevation and SD depression consistent with acute pericarditis.  A small circumferential pericardial effusion was noted on CTA chest.  She was discharged but symptoms began to return in January 2023; she was readmitted to [facility] where echocardiogram showed no pericardial effusion but CTA chest showed small bilateral pleural effusions and nonspecific thickening of the pericardium; she was treated with steroids.  Her chest pain continues and is associated with back pain; both are made worse when laying flat.  Limited echo on January 11, 2023 showed a small pericardial effusion with no evidence of tamponade.  She was started on 650 mg aspirin 3 times daily and 0.6 mg colchicine twice daily.  Dr. Queen reviewed the CD of images of testing she had done at [facility]; it also shows a circumferential pericardial effusion. We have started to wean aspirin and she is tolerating this.   2. Acute pericarditis: This is likely related to her rheumatoid arthritis or reaction to Rituxan for polyarteritis nodosa.  January 2023 CTA chest showed small bilateral pleural effusions.  She continues to improves.    3. Pericardial effusion: Limited echocardiogram from January 11, 2023 showed a small, less than 1 cm, pericardial effusion with no evidence of tamponade.  She has been treated with aspirin and colchicine; tolerating wean her aspirin.  4. 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 Ivette Matias.  5. Iron deficiency anemia: Her hemoglobin was improved at last check.  She has chronic issues with  anemia.  6. Obstructive sleep apnea: She is compliant with CPAP.    Ms. Chicas is a patient who has been evaluated by Dr. Queen with acute pericarditis either likely due to rheumatoid arthritis or reaction to Rituxan.  CTA chest in January 2023 showed small bilateral pleural effusions and thickening of the pericardium; she was treated with steroids at [facility].  Limited echocardiogram from January 11, 2023 showed a small pericardial effusion with no evidence of tamponade.  She has been treated with 650 mg aspirin 3 times daily and 0.6 mg colchicine twice daily.    She is feeling better. I will have her reduce aspirin to 325 mg TID; in two weeks, we will reduced colchicine to 0.3 mg BID. I will see her back in 4 weeks. I've asked her to call if she feels that she has recurrence of symptoms during this weaning process.     Return in about 4 weeks (around 4/17/2023) for Follow-up with EDWINA Em.  Orders Placed This Encounter   Procedures   • ECG 12 Lead      New Medications Ordered This Visit   Medications   • aspirin 325 MG tablet     Sig: Take 1 tablet by mouth Daily. 650 mg QAM and midday; 325 mg QPM     Dispense:  90 tablet     Refill:  3   • colchicine 0.6 MG tablet     Sig: Take 0.5 tablets by mouth 2 (Two) Times a Day.     Dispense:  30 tablet     Refill:  3         Thank you for the opportunity to participate in this patient's care.    EDWINA Em    This office note has been dictated.

## 2023-05-15 NOTE — TELEPHONE ENCOUNTER
Failed protocol    NOV-06/29/23-MM  LOV-03/20/23-MM    Assessment:  1. Chest discomfort    2. Pleuropericarditis    3. Pericardial effusion    4. Vasculitis (HCC)          1. Chest pain: She presented to the ER with chest pain in December 2022; pericardium not well visualized on echocardiogram but EKG showed ST elevation and ME depression consistent with acute pericarditis.  A small circumferential pericardial effusion was noted on CTA chest.  She was discharged but symptoms began to return in January 2023; she was readmitted to [facility] where echocardiogram showed no pericardial effusion but CTA chest showed small bilateral pleural effusions and nonspecific thickening of the pericardium; she was treated with steroids.  Her chest pain continues and is associated with back pain; both are made worse when laying flat.  Limited echo on January 11, 2023 showed a small pericardial effusion with no evidence of tamponade.  She was started on 650 mg aspirin 3 times daily and 0.6 mg colchicine twice daily.  Dr. Queen reviewed the CD of images of testing she had done at [facility]; it also shows a circumferential pericardial effusion. We have started to wean aspirin and she is tolerating this.   2. Acute pericarditis: This is likely related to her rheumatoid arthritis or reaction to Rituxan for polyarteritis nodosa.  January 2023 CTA chest showed small bilateral pleural effusions.  She continues to improves.    3. Pericardial effusion: Limited echocardiogram from January 11, 2023 showed a small, less than 1 cm, pericardial effusion with no evidence of tamponade.  She has been treated with aspirin and colchicine; tolerating wean her aspirin.  4. 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 Ivette Matias.  5. Iron deficiency anemia: Her hemoglobin was improved at last check.  She has chronic issues with anemia.  6. Obstructive sleep apnea: She is compliant with  CPAP.     Ms. Chicas is a patient who has been evaluated by Dr. Queen with acute pericarditis either likely due to rheumatoid arthritis or reaction to Rituxan.  CTA chest in January 2023 showed small bilateral pleural effusions and thickening of the pericardium; she was treated with steroids at [facility].  Limited echocardiogram from January 11, 2023 showed a small pericardial effusion with no evidence of tamponade.  She has been treated with 650 mg aspirin 3 times daily and 0.6 mg colchicine twice daily.     She is feeling better. I will have her reduce aspirin to 325 mg TID; in two weeks, we will reduced colchicine to 0.3 mg BID. I will see her back in 4 weeks. I've asked her to call if she feels that she has recurrence of symptoms during this weaning process.      Return in about 4 weeks (around 4/17/2023) for Follow-up with EDWINA Em.

## 2023-05-16 ENCOUNTER — TELEPHONE (OUTPATIENT)
Dept: CARDIOLOGY | Facility: CLINIC | Age: 48
End: 2023-05-16
Payer: COMMERCIAL

## 2023-05-16 NOTE — TELEPHONE ENCOUNTER
Can you call to see how this patient is doing?   What dose of colchicine is she taking?    Thanks!  EWDINA Em

## 2023-05-17 RX ORDER — COLCHICINE 0.6 MG/1
0.3 TABLET ORAL 2 TIMES DAILY
Qty: 30 TABLET | Refills: 3
Start: 2023-05-17

## 2023-05-17 RX ORDER — COLCHICINE 0.6 MG/1
TABLET ORAL
Qty: 60 TABLET | OUTPATIENT
Start: 2023-05-17

## 2023-05-17 NOTE — TELEPHONE ENCOUNTER
OK, I think we should continue the same for now. If she wants to try to reduce aspirin, she can. However, if chest pain or breathing becomes worse, I would go back up on aspirin.     Thanks!  EDWINA Em

## 2023-05-17 NOTE — TELEPHONE ENCOUNTER
She reports she's feeling well overall but is having some occasional mild back pain, such as with deep breathing.  She wants you to know that she is still doing 2-3, 325 mg ASA every day.    She is taking 0.3 mg colchicine BID (half tab in AM and half tab in PM).    Thank you,    Mary Mckinney RN  Triage [facility]  05/17/23 09:21 EDT

## 2023-05-17 NOTE — TELEPHONE ENCOUNTER
I spoke with Lauren Chicas and updated pt on recommendations from provider.  Pt verbalized understanding and has no further questions at this time.    Thank you,    Mary Mckinney RN  Triage [facility]  05/17/23 09:30 EDT

## 2023-05-30 ENCOUNTER — TELEPHONE (OUTPATIENT)
Dept: INTERNAL MEDICINE | Facility: CLINIC | Age: 48
End: 2023-05-30

## 2023-05-30 NOTE — TELEPHONE ENCOUNTER
PATIENT WANTS TO HAVE HER LABS DONE FOR HER PHYSICAL AT [facility], SHE WANTS TO MAKE SURE AN IRON TEST WILL BE INCLUDED. PLEASE ADVISE.

## 2023-05-31 DIAGNOSIS — Z00.00 HEALTH CARE MAINTENANCE: ICD-10-CM

## 2023-05-31 DIAGNOSIS — D50.9 IRON DEFICIENCY ANEMIA, UNSPECIFIED IRON DEFICIENCY ANEMIA TYPE: Primary | ICD-10-CM

## 2023-05-31 DIAGNOSIS — D64.9 ANEMIA, UNSPECIFIED TYPE: ICD-10-CM

## 2023-05-31 DIAGNOSIS — R73.03 PREDIABETES: ICD-10-CM

## 2023-05-31 RX ORDER — COLCHICINE 0.6 MG/1
TABLET ORAL
Qty: 60 TABLET | Refills: 2 | Status: SHIPPED | OUTPATIENT
Start: 2023-05-31

## 2023-06-12 ENCOUNTER — TELEPHONE (OUTPATIENT)
Dept: INTERNAL MEDICINE | Facility: CLINIC | Age: 48
End: 2023-06-12

## 2023-06-12 NOTE — TELEPHONE ENCOUNTER
Caller: Lauren Chicas    Relationship: Self    Best call back number: [phone]     What is the best time to reach you: ANY    Who are you requesting to speak with (clinical staff, provider,  specific staff member): CLINICAL    What was the call regarding: PATIENT WAS SUPPOSED TO HAVE LAB RESULTS FAXED OVER TO [facility]. SHE WOULD LIKE TO VERIFY THAT THEY WERE RECEIVED.    Is it okay if the provider responds through [place]: NO

## 2023-06-15 ENCOUNTER — TELEPHONE (OUTPATIENT)
Dept: INTERNAL MEDICINE | Facility: CLINIC | Age: 48
End: 2023-06-15

## 2023-06-15 NOTE — TELEPHONE ENCOUNTER
Caller: Lauren Chicas    Relationship: Self    Best call back number: [phone]     What orders are you requesting (i.e. lab or imaging): IRON INFUSION    In what timeframe would the patient need to come in: ASAP    Additional notes: PATIENT STATED HER IRON IS LOW, AS SHE IS EXHAUSTED ALL THE TIME.    PATIENT STATED IN JANUARY SHE RECEIVED AN IRON INFUSION FROM THE HOSPITAL, AND SHE WOULD LIKE THIS DONE AGAIN.    PLEASE CALL TO DISCUSS AND ADVISE.

## 2023-06-23 LAB
ALBUMIN SERPL-MCNC: 3.9 G/DL (ref 3.5–5.2)
ALBUMIN/GLOB SERPL: 1.5 G/DL
ALP SERPL-CCNC: 31 U/L (ref 39–117)
ALT SERPL-CCNC: 15 U/L (ref 1–33)
AST SERPL-CCNC: 12 U/L (ref 1–32)
BASOPHILS # BLD AUTO: 0.05 10*3/MM3 (ref 0–0.2)
BASOPHILS NFR BLD AUTO: 0.6 % (ref 0–1.5)
BILIRUB SERPL-MCNC: 0.3 MG/DL (ref 0–1.2)
BUN SERPL-MCNC: 16 MG/DL (ref 6–20)
BUN/CREAT SERPL: 22.9 (ref 7–25)
CALCIUM SERPL-MCNC: 9.3 MG/DL (ref 8.6–10.5)
CHLORIDE SERPL-SCNC: 105 MMOL/L (ref 98–107)
CHOLEST SERPL-MCNC: 168 MG/DL (ref 0–200)
CHOLEST/HDLC SERPL: 2.75 {RATIO}
CO2 SERPL-SCNC: 24.9 MMOL/L (ref 22–29)
CREAT SERPL-MCNC: 0.7 MG/DL (ref 0.57–1)
EGFRCR SERPLBLD CKD-EPI 2021: 106.8 ML/MIN/1.73
EOSINOPHIL # BLD AUTO: 0.15 10*3/MM3 (ref 0–0.4)
EOSINOPHIL NFR BLD AUTO: 1.9 % (ref 0.3–6.2)
ERYTHROCYTE [DISTWIDTH] IN BLOOD BY AUTOMATED COUNT: 17.2 % (ref 12.3–15.4)
FERRITIN SERPL-MCNC: 14.4 NG/ML (ref 13–150)
GLOBULIN SER CALC-MCNC: 2.6 GM/DL
GLUCOSE SERPL-MCNC: 86 MG/DL (ref 65–99)
HBA1C MFR BLD: 5.5 % (ref 4.8–5.6)
HCT VFR BLD AUTO: 22 % (ref 34–46.6)
HDLC SERPL-MCNC: 61 MG/DL (ref 40–60)
HGB BLD-MCNC: 7 G/DL (ref 12–15.9)
IMM GRANULOCYTES # BLD AUTO: 0.04 10*3/MM3 (ref 0–0.05)
IMM GRANULOCYTES NFR BLD AUTO: 0.5 % (ref 0–0.5)
IRON SATN MFR SERPL: 4 % (ref 20–50)
IRON SERPL-MCNC: 16 MCG/DL (ref 37–145)
LDLC SERPL CALC-MCNC: 92 MG/DL (ref 0–100)
LYMPHOCYTES # BLD AUTO: 0.38 10*3/MM3 (ref 0.7–3.1)
LYMPHOCYTES NFR BLD AUTO: 4.8 % (ref 19.6–45.3)
MCH RBC QN AUTO: 24.9 PG (ref 26.6–33)
MCHC RBC AUTO-ENTMCNC: 31.8 G/DL (ref 31.5–35.7)
MCV RBC AUTO: 78.3 FL (ref 79–97)
MONOCYTES # BLD AUTO: 0.46 10*3/MM3 (ref 0.1–0.9)
MONOCYTES NFR BLD AUTO: 5.8 % (ref 5–12)
NEUTROPHILS # BLD AUTO: 6.88 10*3/MM3 (ref 1.7–7)
NEUTROPHILS NFR BLD AUTO: 86.4 % (ref 42.7–76)
NRBC BLD AUTO-RTO: 0 /100 WBC (ref 0–0.2)
PLATELET # BLD AUTO: 416 10*3/MM3 (ref 140–450)
POTASSIUM SERPL-SCNC: 4 MMOL/L (ref 3.5–5.2)
PROT SERPL-MCNC: 6.5 G/DL (ref 6–8.5)
RBC # BLD AUTO: 2.81 10*6/MM3 (ref 3.77–5.28)
SODIUM SERPL-SCNC: 143 MMOL/L (ref 136–145)
TIBC SERPL-MCNC: 380 MCG/DL
TRIGL SERPL-MCNC: 81 MG/DL (ref 0–150)
TSH SERPL DL<=0.005 MIU/L-ACNC: 0.76 UIU/ML (ref 0.27–4.2)
UIBC SERPL-MCNC: 364 MCG/DL (ref 112–346)
VLDLC SERPL CALC-MCNC: 15 MG/DL (ref 5–40)
WBC # BLD AUTO: 7.96 10*3/MM3 (ref 3.4–10.8)

## 2023-07-05 PROBLEM — R59.1 LYMPHADENOPATHY: Status: ACTIVE | Noted: 2023-01-03

## 2023-07-05 PROBLEM — E04.1 THYROID NODULE: Status: ACTIVE | Noted: 2023-01-03

## 2023-07-05 PROBLEM — J96.01 ACUTE RESPIRATORY FAILURE WITH HYPOXIA: Status: ACTIVE | Noted: 2023-01-03

## 2023-07-05 PROBLEM — E61.1 IRON DEFICIENCY: Status: ACTIVE | Noted: 2023-01-10

## 2023-07-05 PROBLEM — E78.5 HYPERLIPIDEMIA: Status: ACTIVE | Noted: 2023-01-03

## 2023-07-07 ENCOUNTER — TELEPHONE (OUTPATIENT)
Dept: INTERNAL MEDICINE | Facility: CLINIC | Age: 48
End: 2023-07-07

## 2023-07-07 NOTE — TELEPHONE ENCOUNTER
Caller: Juan Francisco Lauren    Relationship: Self    Best call back number: [phone]     What specialty or service is being requested: HEMATOLOGY    What is the provider, practice or medical service name: [facility]    What is the office location: ANY    What is the office phone number: ANY    Any additional details: PATIENT ALREADY HAS APPOINTMENTS FOR INFUSIONS THROUGH [facility].

## 2023-10-11 NOTE — ANESTHESIA PREPROCEDURE EVALUATION
Anesthesia Evaluation     Patient summary reviewed and Nursing notes reviewed   NPO Solid Status: > 8 hours  NPO Liquid Status: > 2 hours           Airway   Mallampati: II  Neck ROM: full  No difficulty expected  Dental - normal exam     Pulmonary     breath sounds clear to auscultation  (+) sleep apnea,   Cardiovascular     Rhythm: regular        Neuro/Psych  (+) headaches,    GI/Hepatic/Renal/Endo    (+) obesity,       Musculoskeletal     Abdominal   (+) obese,    Substance History      OB/GYN          Other   arthritis,                      Anesthesia Plan    ASA 2     MAC     intravenous induction     Anesthetic plan, risks, benefits, and alternatives have been provided, discussed and informed consent has been obtained with: patient.        CODE STATUS:        Cardiopulmonary Rehab Treatment Plan   Name: Derek Dejesus Assessment Date: 10/11/2023   : 1955 Primary Diagnosis: Treatment Diagnosis 1: CABG      Age: 70 y.o. Referring Physician:  Kevin Leon   MRN: [id number] Primary Care Physician: Sneha Murphy MD   Treatment Diagnosis  Treatment Diagnosis 1: CABG  CABG Date: 23  PCI Date: 23  Referral Date: 23  Significant Cardiovascular History: History of heart failure; Peripheral arterial disease; Previous myocardial infarction; Previous PCI  Co-morbidities: Pulmonary disease    Individual Treatment Plan  ITP Visit Type: Re-assessment  1st Date of Exercise : 23  ITP Next Review Date: 10/12/23  Visit #/Total Visits: 18/36  EF%: 25 %  Risk Stratification: High  ITP: Exercise; Psychosocial; Tobacco; Nutrition; Education    Exercise   Stages of Change: Preparation  Assisted Devices: None  Pitt Total Score: 15  Test: Six minute walk test    Data Measured Before Walk  Heart Rate: 90  Blood Pressure: 114/74  O2 Saturation: 98  O2 Device: Room air  RPD: 0.5    Data Measured during Walk  Do You Have Shortness of Breath: Yes  Describe Type of Pain You Are Having: pain in hips and legs, taking one rest.  Peak RPE: 13/20  Peak RPD: 0.3    Data Measured Immediately After Walk  Distance Walked (ft): 920 ft  Peak Heart Rate: 115  Peak Blood Pressure: 128/68  Modified Evelio Scale: 3  RPE: 13/20  O2 Saturation: 87 (SpO2 returned to 91% after resting and deep breathing.)  O2 Flow Rate (L/min): 0 l/min    Data Measured at 5 Minutes After Walk  Heart Rate: 94  Blood Pressure: 128/70  SpO2: 99 %  O2 Device: Room air    Exercise Prescription  Mode: Stepper; Elliptical; Track  Frequency per week: 3  Duration Per Session: 25-40+ minutes  Intensity METS      : 3-6  RPE: 11-13  Progression: pt tolerates low to moderate levels of exercise fair in target RPE zones.   Resistance Training: Yes (2 lb weights)    Exercise Blood Pressures  Resting BP:

## 2023-11-14 ENCOUNTER — TELEPHONE (OUTPATIENT)
Dept: INTERNAL MEDICINE | Facility: CLINIC | Age: 48
End: 2023-11-14
Payer: COMMERCIAL

## 2023-11-14 RX ORDER — NITROFURANTOIN 25; 75 MG/1; MG/1
100 CAPSULE ORAL 2 TIMES DAILY
Qty: 10 CAPSULE | Refills: 0 | Status: SHIPPED | OUTPATIENT
Start: 2023-11-14

## 2023-11-14 NOTE — TELEPHONE ENCOUNTER
Caller: Lauren Chicas    Relationship: Self    Best call back number: [phone] (Mobile)     What is the best time to reach you: ANY     Who are you requesting to speak with (clinical staff, provider,  specific staff member): CLINICAL     What was the call regarding: PATIENT STATES SHE HAD A URINALYSIS DONE AT THE [facility]EMATOLOGIST AND THEY FAXED OVER RESULTS ON THE FIRST. PATIENT HAS NOT HEARD ANYTHING SINCE. SHE WANTS TO KNOW IF SHE IS GOING TO BE PRESCRIBED SOMETHING. PLEASE ADVISE.     Is it okay if the provider responds through MyChart: YES

## 2023-12-15 ENCOUNTER — OFFICE VISIT (OUTPATIENT)
Dept: INTERNAL MEDICINE | Facility: CLINIC | Age: 48
End: 2023-12-15
Payer: COMMERCIAL

## 2023-12-15 VITALS
BODY MASS INDEX: 40.45 KG/M2 | SYSTOLIC BLOOD PRESSURE: 110 MMHG | DIASTOLIC BLOOD PRESSURE: 70 MMHG | HEART RATE: 96 BPM | TEMPERATURE: 98.7 F | WEIGHT: 219.8 LBS | OXYGEN SATURATION: 98 % | HEIGHT: 62 IN

## 2023-12-15 DIAGNOSIS — J02.9 SORE THROAT: ICD-10-CM

## 2023-12-15 DIAGNOSIS — J10.1 INFLUENZA B: Primary | ICD-10-CM

## 2023-12-15 DIAGNOSIS — R05.9 COUGH, UNSPECIFIED TYPE: ICD-10-CM

## 2023-12-15 LAB
EXPIRATION DATE: ABNORMAL
EXPIRATION DATE: NORMAL
FLUAV AG UPPER RESP QL IA.RAPID: NOT DETECTED
FLUBV AG UPPER RESP QL IA.RAPID: DETECTED
INTERNAL CONTROL: ABNORMAL
INTERNAL CONTROL: NORMAL
Lab: ABNORMAL
Lab: NORMAL
S PYO AG THROAT QL: NEGATIVE
SARS-COV-2 AG UPPER RESP QL IA.RAPID: NOT DETECTED

## 2023-12-15 PROCEDURE — 87428 SARSCOV & INF VIR A&B AG IA: CPT | Performed by: NURSE PRACTITIONER

## 2023-12-15 PROCEDURE — 99213 OFFICE O/P EST LOW 20 MIN: CPT | Performed by: NURSE PRACTITIONER

## 2023-12-15 PROCEDURE — 87880 STREP A ASSAY W/OPTIC: CPT | Performed by: NURSE PRACTITIONER

## 2023-12-15 RX ORDER — OSELTAMIVIR PHOSPHATE 75 MG/1
75 CAPSULE ORAL 2 TIMES DAILY
Qty: 10 CAPSULE | Refills: 0 | Status: SHIPPED | OUTPATIENT
Start: 2023-12-15

## 2023-12-15 NOTE — PROGRESS NOTES
Subjective   Lauren Chicas is a 48 y.o. female. Patient is here today for   Chief Complaint   Patient presents with    Sore Throat       Patient complains of sore throat nasal congesting for 6 days, patient tested negative twice at home for covid.     Nasal Congestion    Cough   .    History of Present Illness   C/o sore throat associated with fatigue, fever, nasal congestion. She tested negative for Covid. Some wheezing, shortness of breath, cough.  Fever as high as 101.3F  She has taken Tylenol, Mucinex, Emergen-C    The following portions of the patient's history were reviewed and updated as appropriate: allergies, current medications, past family history, past medical history, past social history, past surgical history and problem list.    Review of Systems    Objective   Vitals:    12/15/23 1330   BP: 110/70   Pulse: 96   Temp: 98.7 °F (37.1 °C)   SpO2: 98%     Body mass index is 40.19 kg/m².  Physical Exam  Vitals and nursing note reviewed.   Constitutional:       Appearance: Normal appearance. She is well-developed.   HENT:      Right Ear: Ear canal normal. A middle ear effusion is present.      Left Ear: Ear canal normal. A middle ear effusion is present.      Mouth/Throat:      Pharynx: Oropharynx is clear.   Cardiovascular:      Rate and Rhythm: Normal rate and regular rhythm.      Heart sounds: Normal heart sounds.   Pulmonary:      Effort: Pulmonary effort is normal.      Breath sounds: Normal breath sounds.   Skin:     General: Skin is warm and dry.   Neurological:      Mental Status: She is alert and oriented to person, place, and time.   Psychiatric:         Speech: Speech normal.         Behavior: Behavior normal.         Thought Content: Thought content normal.       Results for orders placed or performed in visit on 12/15/23   POCT rapid strep A    Specimen: Swab   Result Value Ref Range    Rapid Strep A Screen Negative Negative, VALID, INVALID, Not Performed    Internal Control Passed Passed    Lot  Number [id number]     Expiration Date 11,192,024    POCT SARS-CoV-2 + Flu Antigen DANNY    Specimen: Swab   Result Value Ref Range    SARS Antigen Not Detected Not Detected, Presumptive Negative    Influenza A Antigen DANNY Not Detected Not Detected    Influenza B Antigen DANNY Detected (A) Not Detected    Internal Control Passed Passed    Lot Number [id number]     Expiration Date 11,102,024        Assessment & Plan   Diagnoses and all orders for this visit:    1. Influenza B (Primary)  -     oseltamivir (Tamiflu) 75 MG capsule; Take 1 capsule by mouth 2 (Two) Times a Day.  Dispense: 10 capsule; Refill: 0    2. Sore throat  -     POCT rapid strep A    3. Cough, unspecified type  -     POCT SARS-CoV-2 + Flu Antigen DANNY      Even though it has been greater than 48 hours since her symptoms started, will prescribe Tamiflu due to her continued symptoms.  Patient also has a decreased immune system due to the medications that she takes for her rheumatoid arthritis.  Her next infusion for RA is in 1 week. Continue with OTC meds as above. Increase fluid intake, rest.

## 2024-01-23 ENCOUNTER — OFFICE VISIT (OUTPATIENT)
Dept: CARDIOLOGY | Facility: CLINIC | Age: 49
End: 2024-01-23
Payer: COMMERCIAL

## 2024-01-23 VITALS
HEIGHT: 62 IN | SYSTOLIC BLOOD PRESSURE: 125 MMHG | BODY MASS INDEX: 42.14 KG/M2 | WEIGHT: 229 LBS | HEART RATE: 83 BPM | DIASTOLIC BLOOD PRESSURE: 75 MMHG

## 2024-01-23 DIAGNOSIS — I31.9 PLEUROPERICARDITIS: Primary | ICD-10-CM

## 2024-01-23 DIAGNOSIS — I77.6 VASCULITIS: ICD-10-CM

## 2024-01-23 DIAGNOSIS — D50.9 IRON DEFICIENCY ANEMIA, UNSPECIFIED IRON DEFICIENCY ANEMIA TYPE: ICD-10-CM

## 2024-01-23 DIAGNOSIS — I31.39 PERICARDIAL EFFUSION: ICD-10-CM

## 2024-01-23 DIAGNOSIS — M06.9 RHEUMATOID ARTHRITIS, INVOLVING UNSPECIFIED SITE, UNSPECIFIED WHETHER RHEUMATOID FACTOR PRESENT: ICD-10-CM

## 2024-01-23 PROCEDURE — 93000 ELECTROCARDIOGRAM COMPLETE: CPT | Performed by: NURSE PRACTITIONER

## 2024-01-23 PROCEDURE — 99214 OFFICE O/P EST MOD 30 MIN: CPT | Performed by: NURSE PRACTITIONER

## 2024-01-23 RX ORDER — POLYETHYLENE GLYCOL 3350 17 G/17G
17 POWDER, FOR SOLUTION ORAL
COMMUNITY

## 2024-01-23 NOTE — PROGRESS NOTES
"      [facility] CARDIOLOGY  [address]  [place] [address]  Phone: [phone]  Fax: [phone]  Patient Name: Lauren Chicas  :1975  Age: 48 y.o.  Primary Cardiologist: Liza Queen MD  Encounter Provider:  EDWINA Brown    History of Present Illness     Lauren Chicas is a 48 y.o.  female whose medical history includes rheumatoid arthritis, and obstructive sleep apnea.  She is followed in our office by Dr. Queen for pericarditis associated with rheumatoid arthritis. I have reviewed the past medical records in preparation of today's visit.     24 Follow-up:  She is here for 6-month follow-up.  She is now off colchicine and aspirin.  She has not had any recurrence of her symptoms associated with pericarditis.  She did have to go to the hospital in 2023 for severe right foot pain which was felt to be related to RA; there was no injury or wound.  She also had hysterectomy in 2023 and is doing great afterwards.  She denies chest pain, dyspnea with exertion, orthopnea, palpitations, or leg swelling.  She is taking her medications as prescribed.  She started to exercise again and hopes to lose some weight.    Data Review     The following data was reviewed by EDWINA Brown on 24:    Vital Signs:   /75   Pulse 83   Ht 157.5 cm (62\")   Wt 104 kg (229 lb)   BMI 41.88 kg/m²       Weight:  Wt Readings from Last 3 Encounters:   24 104 kg (229 lb)   12/15/23 99.7 kg (219 lb 12.8 oz)   23 99.3 kg (219 lb)     Body mass index is 41.88 kg/m².    Below is a summary of pertinent cardiology findings:  She is , has 2 children, works as an , has never smoked, uses no alcohol, has 2 cups of coffee daily, and uses no drugs.  Family history includes her mother and father with hypertension in her maternal grandmother with diabetes.  History of rheumatoid " arthritis which has been difficult to manage; she follows with Dr. Ivette Matias.  She also has polyarteritis nodosa and is on Rituxan per Dr. Matias.  She developed severe chest pain 1 week after her second dose of Rituxan in December 2022.  This was also associated with a feeling of her heart racing and short windedness.  December 2022 admitted to [facility] for chest pain, and short windedness.  Echocardiogram showed EF 63%, mild mitral tricuspid insufficiencies, normal RVSP, grade 1 diastolic dysfunction, and the pericardium was not well visualized.  Her EKG showed ST elevation and VA depression consistent with acute pericarditis.  December 2022 CTA chest showed no evidence of pulmonary as well as, aortic dissection, no pleural effusions but a small circumferential pericardial fluid, calcified area in the mid LAD.  January 2023 started to feel worse again presented to the emergency room at [facility].  Hemoglobin was 7.8, ESR was elevated, free kappa and lambda light chains were both elevated though ratio normal, protein electrophoresis showed low albumin and increased alpha-1 globulins, and AIDEE showed elevated IgG, IgA, and IgM.  January 2023 echocardiogram at [facility] showed EF 59% and no pericardial effusion.  CTA chest showed by axillary lymphadenopathy, no evidence of PE or aortic dissection, small bilateral pleural effusions, nonspecific mild thickening of the pericardium.  She had lymph node biopsy and was treated with steroids.  January 2023 Limited echocardiogram shows EF 60.5%, normal RVSP, a small (less than 1 cm) pericardial effusion but no evidence of tamponade.    Labs:  01/06/2023:  cr 0.5, K 4.0, otherwise unremarkable CMP, Hgb 9.0, Plt 426  06/23/2023: Iron 72, iron sat 25, TS H0.550, hemoglobin 6.8  06/24/2023: cr 0.5, K 3.8, otherwise unremarkable BMP, Hgb 8.3,   10/02/2023:  cr 0.9, K 4.3, ALP 39, otherwise unremarkable CMP, Hgb 10.2,       ECG 12  Lead    Date/Time: 1/23/2024 1:58 PM  Performed by: Lucy Singh APRN    Authorized by: Lucy Singh APRN  Comparison: compared with previous ECG from 6/29/2023  Similar to previous ECG  Rhythm: sinus rhythm  Rate: normal  BPM: 83  T flattening: aVL          Medications     Allergies as of 01/23/2024 - Reviewed 01/23/2024   Allergen Reaction Noted    Sulfa antibiotics Swelling and Anaphylaxis 09/19/2012         Current Outpatient Medications:     ascorbic acid (VITAMIN C) 1000 MG tablet, Daily., Disp: , Rfl:     BIOTIN PO, Take  by mouth 2 (Two) Times a Day., Disp: , Rfl:     calcium citrate-vitamin d (CITRACAL) 200-250 MG-UNIT tablet tablet, Take  by mouth Daily., Disp: , Rfl:     cetirizine (zyrTEC) 10 MG tablet, Take 1 tablet by mouth Daily., Disp: , Rfl:     folic acid (FOLVITE) 1 MG tablet, Take 1 tablet by mouth 2 (Two) Times a Week., Disp: , Rfl:     guaiFENesin (Mucinex) 600 MG 12 hr tablet, Take 2 tablets by mouth 2 (Two) Times a Day., Disp: , Rfl:     hydroxychloroquine (PLAQUENIL) 200 MG tablet, Take  by mouth 2 (Two) Times a Day. Unsure of dosage, Disp: , Rfl:     Methotrexate Sodium 50 MG/2ML injection, 1 (One) Time Per Week. Last dose 12/18/22, Disp: , Rfl:     Omega-3 Fatty Acids (FISH OIL PO), Take  by mouth Daily., Disp: , Rfl:     riTUXimab (RITUXAN) 10 MG/ML solution injection, Infuse  into a venous catheter. As directed- every 4 months, Disp: , Rfl:     polyethylene glycol (MIRALAX) 17 GM/SCOOP powder, Take 17 g by mouth., Disp: , Rfl:      Past History, Review of Systems, Exam     Past Medical History:   Diagnosis Date    Anemia     Rheumatoid arthritis     Vasculitis        Past Surgical History:   has a past surgical history that includes LASIK; Colonoscopy (N/A, 10/14/2022); and Leg Biopsy.     Social History     Socioeconomic History    Marital status:     Number of children: 2   Tobacco Use    Smoking status: Never    Smokeless tobacco: Never   Vaping  Use    Vaping Use: Never used   Substance and Sexual Activity    Alcohol use: No     Comment: Caffeine use: 2 cups daily    Drug use: No    Sexual activity: Yes     Partners: Male     Birth control/protection: Vasectomy       Review of Systems   Cardiovascular:  Negative for chest pain, claudication, cyanosis, dyspnea on exertion, irregular heartbeat, leg swelling, near-syncope, orthopnea, palpitations, paroxysmal nocturnal dyspnea and syncope.   Respiratory:  Negative for shortness of breath.    Musculoskeletal:  Positive for joint pain. Negative for back pain.       Vitals reviewed.   Constitutional:       Appearance: Not in distress.   Eyes:      Conjunctiva/sclera: Conjunctivae normal.      Pupils: Pupils are equal, round, and reactive to light.   HENT:      Head: Normocephalic.      Nose: Nose normal.    Mouth/Throat:      Pharynx: Oropharynx is clear.   Neck:      Vascular: JVD normal.   Pulmonary:      Effort: Pulmonary effort is normal.      Breath sounds: Normal breath sounds. No wheezing. No rhonchi. No rales.   Cardiovascular:      Normal rate. Regular rhythm. Normal S1. Normal S2.       Murmurs: There is no murmur.   Pulses:     Intact distal pulses.   Edema:     Peripheral edema absent.   Abdominal:      General: Bowel sounds are normal. There is no distension.      Palpations: Abdomen is soft.      Tenderness: There is no abdominal tenderness.   Musculoskeletal: Normal range of motion.      Cervical back: Normal range of motion and neck supple. Skin:     General: Skin is warm and dry.      Findings: Rash present.   Neurological:      Mental Status: Alert and oriented to person, place and time.   Psychiatric:         Attention and Perception: Attention normal.         Mood and Affect: Mood normal.         Speech: Speech normal.         Behavior: Behavior is cooperative.         Assessment and Plan     Assessment:  1. Pleuropericarditis    2. Pericardial effusion    3. Vasculitis    4. Rheumatoid  arthritis, involving unspecified site, unspecified whether rheumatoid factor present    5. Iron deficiency anemia, unspecified iron deficiency anemia type           Chest pain: She presented to the ER with chest pain in December 2022; pericardium not well visualized on echocardiogram but EKG showed ST elevation and CO depression consistent with acute pericarditis.  A small circumferential pericardial effusion was noted on CTA chest.  She was discharged but symptoms began to return in January 2023; she was readmitted to [facility] where echocardiogram showed no pericardial effusion but CTA chest showed small bilateral pleural effusions and nonspecific thickening of the pericardium; she was treated with steroids.  Her chest pain continues and is associated with back pain; both are made worse when laying flat.  Limited echo on January 11, 2023 showed a small pericardial effusion with no evidence of tamponade.  She was started on high-dose aspirin and colchicine.  She has been in the process of weaning this off but aspirin had to be stopped due to heavy vaginal bleeding.  She is off colchicine and aspirin and chest pain has resolved.  Acute pericarditis: This is likely related to her rheumatoid arthritis or reaction to Rituxan for polyarteritis nodosa.  January 2023 CTA chest showed small bilateral pleural effusions.  She is off colchicine and aspirin; symptoms have resolved.  Pericardial effusion: Limited echocardiogram from January 11, 2023 showed a small, less than 1 cm, pericardial effusion with no evidence of tamponade.  She has been treated with aspirin and colchicine; these medications have been stopped and her symptoms have resolved.  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 Ivette Matias.  She is doing well with this.  Rheumatoid arthritis: She did have hospitalization in September 2023 for severe right foot pain; this is getting better.  Iron deficiency anemia:  She recently had heavy vaginal bleeding with hemoglobin 6.8 and required transfusion.  She did require hysterectomy and feels great.  Obstructive sleep apnea: She is compliant with CPAP.    Ms. Chicas is a patient who has been evaluated by Dr. Queen with acute pericarditis either likely due to rheumatoid arthritis or reaction to Rituxan.  CTA chest in January 2023 showed small bilateral pleural effusions and thickening of the pericardium; she was treated with steroids at [facility].  Limited echocardiogram from January 11, 2023 showed a small pericardial effusion with no evidence of tamponade.  She has been treated with 650 mg aspirin 3 times daily and 0.6 mg colchicine twice daily.  These medications have been stopped after slowly being weaned off and her symptoms remain resolved.      Return in about 6 months (around 7/23/2024) for Follow-up, Dr. Queen.  Orders Placed This Encounter   Procedures    ECG 12 Lead      No orders of the defined types were placed in this encounter.        Thank you for the opportunity to participate in this patient's care.    EDWINA Em    This office note has been dictated.

## 2024-02-12 ENCOUNTER — TELEPHONE (OUTPATIENT)
Dept: CARDIOLOGY | Facility: CLINIC | Age: 49
End: 2024-02-12
Payer: COMMERCIAL

## 2024-02-13 ENCOUNTER — OFFICE VISIT (OUTPATIENT)
Dept: CARDIOLOGY | Facility: CLINIC | Age: 49
End: 2024-02-13
Payer: COMMERCIAL

## 2024-02-13 VITALS
HEART RATE: 73 BPM | SYSTOLIC BLOOD PRESSURE: 110 MMHG | WEIGHT: 226 LBS | HEIGHT: 62 IN | OXYGEN SATURATION: 97 % | BODY MASS INDEX: 41.59 KG/M2 | DIASTOLIC BLOOD PRESSURE: 70 MMHG

## 2024-02-13 DIAGNOSIS — M06.9 RHEUMATOID ARTHRITIS, INVOLVING UNSPECIFIED SITE, UNSPECIFIED WHETHER RHEUMATOID FACTOR PRESENT: ICD-10-CM

## 2024-02-13 DIAGNOSIS — G47.33 OSA (OBSTRUCTIVE SLEEP APNEA): ICD-10-CM

## 2024-02-13 DIAGNOSIS — I31.9 PLEUROPERICARDITIS: Primary | ICD-10-CM

## 2024-02-13 PROCEDURE — 93000 ELECTROCARDIOGRAM COMPLETE: CPT | Performed by: INTERNAL MEDICINE

## 2024-02-13 PROCEDURE — 99214 OFFICE O/P EST MOD 30 MIN: CPT | Performed by: INTERNAL MEDICINE

## 2024-02-13 RX ORDER — ASPIRIN 325 MG
325 TABLET ORAL 2 TIMES DAILY
Qty: 180 TABLET | Refills: 3 | COMMUNITY

## 2024-02-13 RX ORDER — COLCHICINE 0.6 MG/1
0.6 TABLET ORAL 2 TIMES DAILY
Qty: 180 TABLET | Refills: 3 | Status: SHIPPED | OUTPATIENT
Start: 2024-02-13

## 2024-02-20 ENCOUNTER — TELEPHONE (OUTPATIENT)
Dept: CARDIOLOGY | Facility: CLINIC | Age: 49
End: 2024-02-20
Payer: COMMERCIAL

## 2024-02-20 NOTE — TELEPHONE ENCOUNTER
Long term is >12months.         ----- Message from Morenita Salavdor MA sent at 2/20/2024 12:35 PM EST -----  Regarding: FW: Aspirin 325mg   Contact: [phone]  FY  ----- Message -----  From: Lauren Chicas  Sent: 2/19/2024   4:53 PM EST  To: S[facility]  Subject: Aspirin 325mg                                    I didn’t realize Aspirin was an NSAID. My kidney doctor had told me not to take NSAIDS. I called today and told them you had prescribed it for Pericarditis and they advised it was ok to treat my condition but shouldn’t be used long term. I’m not sure what long term means exactly but wanted to make sure you were aware.

## 2024-02-20 NOTE — TELEPHONE ENCOUNTER
Reviewed recommendations with patient, verbalized understanding, will call with any further questions or complaints.    Leatha Barraza RN  Triage Nurse  02/20/24 13:15 EST

## 2024-02-27 ENCOUNTER — TELEPHONE (OUTPATIENT)
Dept: INTERNAL MEDICINE | Facility: CLINIC | Age: 49
End: 2024-02-27
Payer: COMMERCIAL

## 2024-02-27 DIAGNOSIS — N39.0 URINARY TRACT INFECTION WITH HEMATURIA, SITE UNSPECIFIED: Primary | ICD-10-CM

## 2024-02-27 DIAGNOSIS — R31.9 URINARY TRACT INFECTION WITH HEMATURIA, SITE UNSPECIFIED: Primary | ICD-10-CM

## 2024-02-27 RX ORDER — NITROFURANTOIN 25; 75 MG/1; MG/1
100 CAPSULE ORAL 2 TIMES DAILY
Qty: 10 CAPSULE | Refills: 0 | Status: SHIPPED | OUTPATIENT
Start: 2024-02-27

## 2024-02-27 NOTE — TELEPHONE ENCOUNTER
Caller: Lauren Chicas    Relationship: Self    Best call back number:[phone]       What was the call regarding: PATIENT STATES LAB RESULTS FROM HER RHEUMATOLOGIST WAS SUPPOSED TO BE SENT OVER TODAY. SHE WOULD LIKE TO MAKE SURE THE OFFICE RECEIVED THOSE    PLEASE CALL AND ADVISE

## 2024-02-27 NOTE — TELEPHONE ENCOUNTER
----- Message from Marjorie Reza MA sent at 2/27/2024  3:57 PM EST -----  Regarding: FW: UTI  Contact: [phone]    ----- Message -----  From: Lauren Chicas  Sent: 2/27/2024   3:55 PM EST  To: [facility]  Subject: UTI                                              My rheumatologist was supposed to send lab results to you this morning that reflect a possible UTI. Can you confirm receipt and prescribe an antibiotic if needed?  Thank you.

## 2024-02-28 DIAGNOSIS — N39.0 URINARY TRACT INFECTION WITH HEMATURIA, SITE UNSPECIFIED: Primary | ICD-10-CM

## 2024-02-28 DIAGNOSIS — R31.9 URINARY TRACT INFECTION WITH HEMATURIA, SITE UNSPECIFIED: Primary | ICD-10-CM

## 2024-03-20 ENCOUNTER — TELEPHONE (OUTPATIENT)
Dept: INTERNAL MEDICINE | Facility: CLINIC | Age: 49
End: 2024-03-20

## 2024-03-20 NOTE — TELEPHONE ENCOUNTER
Caller: Lauren Chicas    Relationship: Self    Best call back number: [phone]     Caller requesting test results: PATIENT     What test was performed: UA CULTURE     When was the test performed: 3/11/24    Where was the test performed: OFFICE    Additional notes: WANTS TO GO OVER TEST RESULTS ASAP PLEASE

## 2024-05-07 ENCOUNTER — TELEPHONE (OUTPATIENT)
Dept: INTERNAL MEDICINE | Facility: CLINIC | Age: 49
End: 2024-05-07
Payer: COMMERCIAL

## 2024-05-08 ENCOUNTER — OFFICE VISIT (OUTPATIENT)
Dept: INTERNAL MEDICINE | Facility: CLINIC | Age: 49
End: 2024-05-08
Payer: COMMERCIAL

## 2024-05-08 VITALS
TEMPERATURE: 98 F | BODY MASS INDEX: 41.92 KG/M2 | HEART RATE: 87 BPM | OXYGEN SATURATION: 99 % | HEIGHT: 62 IN | WEIGHT: 227.8 LBS | DIASTOLIC BLOOD PRESSURE: 84 MMHG | SYSTOLIC BLOOD PRESSURE: 130 MMHG

## 2024-05-08 DIAGNOSIS — K12.0 APHTHOUS ULCER: Primary | ICD-10-CM

## 2024-05-08 PROCEDURE — 99214 OFFICE O/P EST MOD 30 MIN: CPT | Performed by: INTERNAL MEDICINE

## 2024-05-08 RX ORDER — AZELAIC ACID 0.15 G/G
GEL TOPICAL
COMMUNITY
Start: 2024-05-07

## 2024-05-08 RX ORDER — PREDNISONE 5 MG/1
TABLET ORAL
COMMUNITY
Start: 2024-02-20

## 2024-06-05 RX ORDER — COLCHICINE 0.6 MG/1
0.6 TABLET ORAL 2 TIMES DAILY
Qty: 180 TABLET | Refills: 3 | Status: SHIPPED | OUTPATIENT
Start: 2024-06-05

## 2024-06-05 NOTE — TELEPHONE ENCOUNTER
Patient called requesting to send the Colchicine to  in [facility] d/t cost being cheaper.    Morenita

## 2024-07-03 RX ORDER — COLCHICINE 0.6 MG/1
0.6 TABLET ORAL 2 TIMES DAILY
Qty: 180 TABLET | Refills: 3 | Status: SHIPPED | OUTPATIENT
Start: 2024-07-03

## 2024-07-16 ENCOUNTER — OFFICE VISIT (OUTPATIENT)
Dept: INTERNAL MEDICINE | Facility: CLINIC | Age: 49
End: 2024-07-16
Payer: COMMERCIAL

## 2024-07-16 VITALS
SYSTOLIC BLOOD PRESSURE: 110 MMHG | BODY MASS INDEX: 41.77 KG/M2 | WEIGHT: 227 LBS | HEIGHT: 62 IN | HEART RATE: 91 BPM | DIASTOLIC BLOOD PRESSURE: 80 MMHG | TEMPERATURE: 97.9 F | OXYGEN SATURATION: 98 %

## 2024-07-16 DIAGNOSIS — R73.03 PREDIABETES: ICD-10-CM

## 2024-07-16 DIAGNOSIS — D64.9 ANEMIA, UNSPECIFIED TYPE: ICD-10-CM

## 2024-07-16 DIAGNOSIS — M06.9 RHEUMATOID ARTHRITIS, INVOLVING UNSPECIFIED SITE, UNSPECIFIED WHETHER RHEUMATOID FACTOR PRESENT: ICD-10-CM

## 2024-07-16 DIAGNOSIS — Z00.00 HEALTH CARE MAINTENANCE: Primary | ICD-10-CM

## 2024-07-16 DIAGNOSIS — R60.0 LOCALIZED EDEMA: ICD-10-CM

## 2024-07-16 PROBLEM — E53.8 VITAMIN B12 DEFICIENCY: Status: ACTIVE | Noted: 2024-07-16

## 2024-07-16 PROCEDURE — 99396 PREV VISIT EST AGE 40-64: CPT | Performed by: NURSE PRACTITIONER

## 2024-07-16 RX ORDER — HYDROCHLOROTHIAZIDE 12.5 MG/1
12.5 TABLET ORAL DAILY
Qty: 90 TABLET | Refills: 1 | Status: SHIPPED | OUTPATIENT
Start: 2024-07-16

## 2024-07-16 NOTE — PATIENT INSTRUCTIONS
1. Preventative counseling- continue to work on healthy diet and exercise   2. Edema- consider HCTZ, notify for hypotensive symptoms and/or syst <110   3. Anemia- resolved, ferritin a little high will recheck with next lab draw   4. Prediabetes- cut carbs and sugar, increase activity when able  GYN- UTD   5. Vit B12 def- start B12 1000 mcg daily   6. RA- discussed uncontrolled pain and interruption of sleep, could try elavil and or cymbalta

## 2024-07-16 NOTE — PROGRESS NOTES
Subjective   Lauren Chicas is a 48 y.o. female.     History of Present Illness   The patient is here today for CPE and lab work F/U. She is feeling ok.     Hx of pericarditis- UTD with cards, she came off of the colchicine and had recurrent back pain   RA- still not feeling fully managed. To see rheum on Thursday. She does have pain that messes with sleep.     Had a hyst in November.     Class 3 Severe Obesity (BMI >=40). Obesity-related health conditions include the following: obstructive sleep apnea and hypertension. Obesity is unchanged. BMI is is above average; BMI management plan is completed. We discussed portion control and increasing exercise.   Kids are 21 and 22 yrs old.   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 Son is at home doing construction with his Dad's business.   The following portions of the patient's history were reviewed and updated as appropriate: allergies, current medications, past family history, past medical history, past social history, past surgical history and problem list.    Review of Systems   Constitutional:  Positive for fatigue. Negative for chills and fever.   HENT:  Negative for ear pain, rhinorrhea and sore throat.    Eyes: Negative.    Respiratory:  Negative for cough and shortness of breath.    Cardiovascular:  Positive for leg swelling. Negative for chest pain and palpitations.   Gastrointestinal: Negative.    Endocrine: Negative for cold intolerance and heat intolerance.   Genitourinary:  Negative for breast discharge, breast lump, breast pain, difficulty urinating, dyspareunia, dysuria, flank pain, frequency, genital sores, hematuria, pelvic pain and urgency.   Musculoskeletal:  Positive for arthralgias and joint swelling.   Skin: Negative.    Allergic/Immunologic: Negative for environmental allergies and food allergies.   Neurological: Negative.    Hematological:  Bruises/bleeds easily.   Psychiatric/Behavioral:  Negative for dysphoric mood and suicidal ideas. The patient is not  nervous/anxious.        Objective   Physical Exam  Constitutional:       Appearance: Normal appearance. She is well-developed.      Comments: Body mass index is 41.51 kg/m².     HENT:      Right Ear: Hearing, tympanic membrane, ear canal and external ear normal.      Left Ear: Hearing, tympanic membrane, ear canal and external ear normal.      Nose: Nose normal.      Mouth/Throat:      Pharynx: Uvula midline.   Eyes:      General: Lids are normal.      Conjunctiva/sclera: Conjunctivae normal.      Pupils: Pupils are equal, round, and reactive to light.   Neck:      Thyroid: No thyroid mass or thyromegaly.      Vascular: No carotid bruit.      Trachea: Trachea normal.   Cardiovascular:      Rate and Rhythm: Normal rate and regular rhythm.      Pulses: Normal pulses.      Heart sounds: Normal heart sounds.      Comments: mod  Pulmonary:      Effort: Pulmonary effort is normal.      Breath sounds: Normal breath sounds.   Abdominal:      General: Bowel sounds are normal.      Palpations: Abdomen is soft.      Tenderness: There is no abdominal tenderness.   Musculoskeletal:         General: Normal range of motion.      Cervical back: Normal range of motion.      Right lower leg: Edema present.      Left lower leg: Edema present.   Lymphadenopathy:      Cervical: No cervical adenopathy.      Upper Body:      Right upper body: No supraclavicular adenopathy.      Left upper body: No supraclavicular adenopathy.      Lower Body: No right inguinal adenopathy. No left inguinal adenopathy.   Skin:     General: Skin is warm and dry.   Neurological:      Mental Status: She is alert and oriented to person, place, and time.      GCS: GCS eye subscore is 4. GCS verbal subscore is 5. GCS motor subscore is 6.      Cranial Nerves: No cranial nerve deficit.      Sensory: No sensory deficit.      Deep Tendon Reflexes:      Reflex Scores:       Patellar reflexes are 2+ on the right side and 2+ on the left side.  Psychiatric:          Speech: Speech normal.         Behavior: Behavior normal. Behavior is cooperative.         Thought Content: Thought content normal.         Judgment: Judgment normal.         Vitals:    07/16/24 1524   BP: 110/80   Pulse: 91   Temp: 97.9 °F (36.6 °C)   SpO2: 98%     Body mass index is 41.51 kg/m².      Assessment & Plan   Diagnoses and all orders for this visit:    1. Health care maintenance (Primary)  -     Ferritin; Future  -     CBC & Differential; Future  -     Comprehensive Metabolic Panel; Future  -     Hemoglobin A1c; Future  -     Lipid Panel With LDL / HDL Ratio; Future  -     TSH Rfx On Abnormal To Free T4; Future  -     Vitamin B12 & Folate; Future  -     Hepatitis C Antibody; Future    2. Localized edema  -     Ferritin; Future  -     CBC & Differential; Future  -     Comprehensive Metabolic Panel; Future  -     Hemoglobin A1c; Future  -     Lipid Panel With LDL / HDL Ratio; Future  -     TSH Rfx On Abnormal To Free T4; Future  -     Vitamin B12 & Folate; Future  -     hydroCHLOROthiazide 12.5 MG tablet; Take 1 tablet by mouth Daily.  Dispense: 90 tablet; Refill: 1    3. Anemia, unspecified type  -     Ferritin; Future  -     CBC & Differential; Future  -     Comprehensive Metabolic Panel; Future  -     Hemoglobin A1c; Future  -     Lipid Panel With LDL / HDL Ratio; Future  -     TSH Rfx On Abnormal To Free T4; Future  -     Vitamin B12 & Folate; Future    4. Prediabetes  -     Ferritin; Future  -     CBC & Differential; Future  -     Comprehensive Metabolic Panel; Future  -     Hemoglobin A1c; Future  -     Lipid Panel With LDL / HDL Ratio; Future  -     TSH Rfx On Abnormal To Free T4; Future  -     Vitamin B12 & Folate; Future    5. Rheumatoid arthritis, involving unspecified site, unspecified whether rheumatoid factor present  -     Ferritin; Future  -     CBC & Differential; Future  -     Comprehensive Metabolic Panel; Future  -     Hemoglobin A1c; Future  -     Lipid Panel With LDL / HDL Ratio;  Future  -     TSH Rfx On Abnormal To Free T4; Future  -     Vitamin B12 & Folate; Future                 1. Preventative counseling- continue to work on healthy diet and exercise   2. Edema- consider HCTZ, notify for hypotensive symptoms and/or syst <110   3. Anemia- resolved, ferritin a little high will recheck with next lab draw   4. Prediabetes- cut carbs and sugar, increase activity when able  GYN- UTD   5. Vit B12 def- start B12 1000 mcg daily   6. RA- discussed uncontrolled pain and interruption of sleep, could try elavil and or cymbalta

## 2024-07-30 ENCOUNTER — OFFICE VISIT (OUTPATIENT)
Dept: CARDIOLOGY | Facility: CLINIC | Age: 49
End: 2024-07-30
Payer: COMMERCIAL

## 2024-07-30 VITALS
WEIGHT: 226 LBS | HEART RATE: 84 BPM | HEIGHT: 62 IN | SYSTOLIC BLOOD PRESSURE: 110 MMHG | DIASTOLIC BLOOD PRESSURE: 78 MMHG | BODY MASS INDEX: 41.59 KG/M2

## 2024-07-30 DIAGNOSIS — E78.2 MIXED HYPERLIPIDEMIA: ICD-10-CM

## 2024-07-30 DIAGNOSIS — D50.9 IRON DEFICIENCY ANEMIA, UNSPECIFIED IRON DEFICIENCY ANEMIA TYPE: ICD-10-CM

## 2024-07-30 DIAGNOSIS — I77.6 VASCULITIS: ICD-10-CM

## 2024-07-30 DIAGNOSIS — G47.33 OSA (OBSTRUCTIVE SLEEP APNEA): ICD-10-CM

## 2024-07-30 DIAGNOSIS — I31.9 PLEUROPERICARDITIS: Primary | ICD-10-CM

## 2024-07-30 DIAGNOSIS — M06.9 RHEUMATOID ARTHRITIS, INVOLVING UNSPECIFIED SITE, UNSPECIFIED WHETHER RHEUMATOID FACTOR PRESENT: ICD-10-CM

## 2024-07-30 PROCEDURE — 93000 ELECTROCARDIOGRAM COMPLETE: CPT | Performed by: INTERNAL MEDICINE

## 2024-07-30 PROCEDURE — 99214 OFFICE O/P EST MOD 30 MIN: CPT | Performed by: INTERNAL MEDICINE

## 2024-07-30 RX ORDER — MYCOPHENOLATE MOFETIL 500 MG/1
1000 TABLET, FILM COATED ORAL EVERY 12 HOURS
COMMUNITY
Start: 2024-07-18 | End: 2024-10-15

## 2024-07-30 RX ORDER — COLCHICINE 0.6 MG/1
0.6 TABLET ORAL DAILY
Qty: 90 TABLET | Refills: 1 | Status: SHIPPED | OUTPATIENT
Start: 2024-07-30

## 2024-07-30 RX ORDER — ASPIRIN 325 MG
325 TABLET ORAL DAILY
Qty: 90 TABLET | Refills: 1 | Status: SHIPPED | COMMUNITY
Start: 2024-07-30

## 2024-07-30 NOTE — PROGRESS NOTES
Date of Office Visit: 2024  Encounter Provider: Liza Queen MD  Place of Service: [facility] CARDIOLOGY  Patient Name: Lauren Chicas  :1975      Patient ID:  Lauren Chicas is a 48 y.o. female is here for  followup for pleuropericarditis.        History of Present Illness       She has a history of pericarditis associated with her rheumatoid arthritis.  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 Ivette Matias, history of obstructive sleep apnea on CPAP, obesity and chronic iron deficiency anemia.      She is , has 2 children, works as , has never smoked, uses no alcohol, has 2 cups of coffee per day and no drugs.  Her mother and father have hypertension and her maternal grandmother has diabetes.     On 12/15/2022, she began noticing her heart racing.  On 2022, she woke at 5 AM with a sharp pain in her chest and back as well as short windedness after second dose of Rituxan. She was very nauseated, dyspneic and moved from her bedroom to the couch in her living room where she collapsed and could not get up for about 5 hours.  She was then able to move about but felt poorly through the whole weekend she then on 2022 got up to go to work and when she arrived, she did not feel well and looked poorly.  A coworker advised her to go to the emergency department.  She went to [facility] emergency department and was admitted overnight.  She had an echocardiogram on 2022 which showed ejection fraction of 63% with mild mitral tricuspid insufficiency, normal RVSP and grade 1 diastolic dysfunction.  The pericardium was not well visualized.  Her ECG showed ST elevation and [place] depression insistent with acute pericarditis.  CTA of the chest showed no evidence of pulmonary embolism or aortic dissection, really no pleural effusions but small circumferential pericardial  fluid, small calcified area in the mid LAD.  She was released and felt slightly better.     She started feeling worse again over 12/2023 and had severe chest pain on 1/3/2023 and went to the emergency department at [facility].  Labs on 1/6/2023 - normal CMP, normal lactate, low CK at 25, hemoglobin 7.8, normal platelets and white count, elevated ESR done 1/3/2023 with elevated free kappa and free lambda light chains with a normal free kappa and lambda ratio.  Her protein electrophoresis showed low albumin and increased alpha-1 globulins, just over normal.  Her immunofixation electrophoresis showed elevated IgG, IgA and IgM.  Lipids done 1/3/2023 showed total cholesterol 115, HDL 38, LDL 65, VLDL 12, triglycerides 59, hemoglobin A1c 6%.  She had an echocardiogram done 1/3/2023 at [facility] which showed ejection fraction 59% with no pericardial effusion.  She had a CTA for PE done 1/3/2023 which showed biaxillary lymphadenopathy, no evidence of pulmonary embolism or aortic dissection, small bilateral pleural effusions, nonspecific mild thickening of pericardium.  She did have a lymph node biopsy there-was normal. She was treated with steroids.  Echocardiogram done 1/11/2023 showed ejection fraction 61% with small pericardial effusion, no tamponade.     Renal ultrasound done 9/11/2023 showed nonobstructing left renal calculi and no hydronephrosis.  She had severe right foot pain and was hospitalized September 2023.  The right foot pain was due to her rheumatoid arthritis.  She had a hysterectomy November 2023 and did well.     She started noticing some pain with deep breaths followed by progressive pain in her upper back and across her chest and intermittent heart racing 2/2024.  She weaned off steroids 1/2024-had been on steroids for 2 years prior, after weaning had significant joint aching and fatigue.  She was off of aspirin and colchicine.  When I saw her 2/2024, I felt like her pleuropericarditis was back and we have  restarted her aspirin 325 twice daily and colchicine 0.6 twice daily.  She remains on that at this time.  She feels fine and has no chest pain or difficulty breathing.  She was switched from methotrexate to CellCept thinking that her body aches and pains might be more vasculitic than rheumatoid.  She is also on Plaquenil and Rituxan.  She is hoping to wean off of aspirin and colchicine.  She does not feel her heart racing or skipping.  She has no dizziness, syncope or falls.  She does have fatigue.  She has no orthopnea or PND.    Labs on 7/3/2024 show normal CMP, hemoglobin A1c is 5.7%, normal TSH, total cholesterol 165, HDL 39, , triglycerides 115, VLDL 21, white count 3.26 with otherwise normal CBC, ferritin 21 and otherwise normal iron studies.    Past Medical History:   Diagnosis Date    Anemia     Rheumatoid arthritis     Vasculitis          Past Surgical History:   Procedure Laterality Date    BIOPSY OF LEG      COLONOSCOPY N/A 10/14/2022    Procedure: COLONOSCOPY into cecum and normal TI;  Surgeon: Jerson Trejo MD;  Location: [facility] ENDOSCOPY;  Service: Gastroenterology;  Laterality: N/A;  pre: family hx of colon polyps   post: diverticulosis,  hemorrhoids    LASIK         Current Outpatient Medications on File Prior to Visit   Medication Sig Dispense Refill    ascorbic acid (VITAMIN C) 1000 MG tablet Daily.      aspirin 325 MG tablet Take 1 tablet by mouth 2 (Two) Times a Day. 180 tablet 3    azelaic acid (AZELEX) 15 % gel       BIOTIN PO Take  by mouth 2 (Two) Times a Day.      calcium citrate-vitamin d (CITRACAL) 200-250 MG-UNIT tablet tablet Take  by mouth Daily.      CellCept 500 MG tablet Take 2 tablets by mouth Every 12 (Twelve) Hours.      cetirizine (zyrTEC) 10 MG tablet Take 1 tablet by mouth Daily.      colchicine 0.6 MG tablet Take 1 tablet by mouth 2 (Two) Times a Day. 180 tablet 3    guaiFENesin (Mucinex) 600 MG 12 hr tablet Take 2 tablets by mouth 2 (Two) Times a Day.       "hydrocortisone 2.5 % cream       hydroxychloroquine (PLAQUENIL) 200 MG tablet Take  by mouth 2 (Two) Times a Day. Unsure of dosage      Magic Mouthwash Oral Suspension (diphenhydrAMINE HCl - aluminum & magnesium hydroxide-simethicone - lidocaine) Swish and spit 5 mL Every 4 (Four) Hours As Needed (aphthous ulcers). 400 mL 0    Omega-3 Fatty Acids (FISH OIL PO) Take  by mouth Daily.      predniSONE (DELTASONE) 5 MG tablet       Probiotic Product (PROBIOTIC-10 PO) Take  by mouth Daily.      riTUXimab (RITUXAN) 10 MG/ML solution injection Infuse  into a venous catheter. As directed- every 4 months      TART CHERRY PO Once daily      [DISCONTINUED] folic acid (FOLVITE) 1 MG tablet Take 1 tablet by mouth 2 (Two) Times a Day.      [DISCONTINUED] hydroCHLOROthiazide 12.5 MG tablet Take 1 tablet by mouth Daily. (Patient not taking: Reported on 7/30/2024) 90 tablet 1    [DISCONTINUED] Methotrexate Sodium 50 MG/2ML injection 1 (One) Time Per Week. Last dose 12/18/22       No current facility-administered medications on file prior to visit.       Social History     Socioeconomic History    Marital status:     Number of children: 2   Tobacco Use    Smoking status: Never     Passive exposure: Never    Smokeless tobacco: Never   Vaping Use    Vaping status: Never Used   Substance and Sexual Activity    Alcohol use: No     Comment: Caffeine use: 2 cups daily    Drug use: No    Sexual activity: Yes     Partners: Male     Birth control/protection: Vasectomy             Procedures    ECG 12 Lead    Date/Time: 7/30/2024 2:38 PM  Performed by: Liza Queen MD    Authorized by: Liza Queen MD  Comparison: compared with previous ECG   Similar to previous ECG  Rhythm: sinus rhythm    Clinical impression: normal ECG              Objective:      Vitals:    07/30/24 1429   BP: 110/78   Pulse: 84   Weight: 103 kg (226 lb)   Height: 157.5 cm (62\")     Body mass index is 41.34 kg/m².    Vitals reviewed. "   Constitutional:       General: Not in acute distress.     Appearance: Not diaphoretic.   Neck:      Vascular: No carotid bruit or JVD.   Pulmonary:      Effort: Pulmonary effort is normal.      Breath sounds: Normal breath sounds.   Cardiovascular:      Normal rate. Regular rhythm.      Murmurs: There is no murmur.      No gallop.  No rub.   Pulses:     Intact distal pulses.      Carotid: 2+ bilaterally.     Radial: 2+ bilaterally.     Dorsalis pedis: 2+ bilaterally.     Posterior tibial: 2+ bilaterally.  Edema:     Peripheral edema absent.   Neurological:      Cranial Nerves: No cranial nerve deficit.         Lab Review:       Assessment:      Diagnosis Plan   1. Mixed hyperlipidemia        2. Pleuropericarditis        3. Rheumatoid arthritis, involving unspecified site, unspecified whether rheumatoid factor present        4. MICHAEL (obstructive sleep apnea)        5. Iron deficiency anemia, unspecified iron deficiency anemia type        6. Vasculitis              Acute pleuropericarditis-.  On aspirin 325 mg 2 times daily and colchicine 0.6 twice daily.  May need anakinra.  She is better, will start weaning these meds.  Rheumatoid arthritis, difficult to manage, follows with Dr. Ivette Matias  Polyarteritis nodosa, has been treated with Rituxan for this-Rituxan was started December 2022, may have reacted to it  Iron deficiency anemia, chronic  MICHAEL on CPAP  Obesity  Hyperlipidemia, untreated     Plan:       Decrease aspirin to 325 mg daily and decrease colchicine to 0.6 mg daily.  Continue at these doses for 3 months then discontinue, see Ashly in 4 months to see how she is doing off of these medications.  No other testing at this time.

## 2024-08-07 ENCOUNTER — TELEPHONE (OUTPATIENT)
Dept: CARDIOLOGY | Facility: CLINIC | Age: 49
End: 2024-08-07
Payer: COMMERCIAL

## 2024-08-08 NOTE — TELEPHONE ENCOUNTER
Patient called and stated that her HR has been going up real high intermittently.  She stated that she was not concerned about it at first then it dropped for no reason.  She stated that she then had some pain in her (L) arm and is now concerned.      I have called her to get more information but she did not answer.  Left a message on voicemail to call back and speak with triage.      CB: [phone]    Thanks,  Morenita

## 2024-08-09 NOTE — TELEPHONE ENCOUNTER
Notified patient of recommendations. Patient verbalized understanding.    Deyanira Ureña RN  Triage [facility]

## 2024-08-09 NOTE — TELEPHONE ENCOUNTER
Spoke to patient. Last Thursday or Friday she got a quick onset pain in her left arm. It was between her shoulder and elbow. It was so bad that she couldn't even raise her arm. She said this continued throughout the night and then the next morning she just woke up to it being sore, but she was able to move the extremity.     The second issue is that on Tuesday she was just sitting on the couch and her HR spiked up to 121 for a minute. She then said that a few hours later her HR dropped to 51 for a minute.     These last 2 days she has not had any issues. She is actually on vacation right now, so she is unsure what her HR and BP are running. She did say she has been walking a lot and she has done fine with that. Below are her meds:    Colchicine 0.6mg daily  Aspirin 325mg daily  Hydroxychloroquine 200mg BID    Deyanira Ureña RN  Triage [facility]

## 2024-08-09 NOTE — TELEPHONE ENCOUNTER
If she is able to be active and not having symptoms associated with that, I am not concerned concerned about the arm symptoms.  If it is cardiac, the arm pain should come on with activity.  Possible that she has some mild nerve impingement in her neck causing the arm pain.  For now we will just watch this.  Also just watch the heart rate, I am not very concerned about this at this time yet either.  If it keeps recurring though, please let me know.

## 2024-09-23 ENCOUNTER — OFFICE VISIT (OUTPATIENT)
Dept: INTERNAL MEDICINE | Facility: CLINIC | Age: 49
End: 2024-09-23
Payer: COMMERCIAL

## 2024-09-23 VITALS
WEIGHT: 228 LBS | DIASTOLIC BLOOD PRESSURE: 62 MMHG | SYSTOLIC BLOOD PRESSURE: 138 MMHG | OXYGEN SATURATION: 99 % | BODY MASS INDEX: 41.96 KG/M2 | HEART RATE: 91 BPM | HEIGHT: 62 IN

## 2024-09-23 DIAGNOSIS — B02.9 HERPES ZOSTER WITHOUT COMPLICATION: Primary | ICD-10-CM

## 2024-09-23 PROCEDURE — 99214 OFFICE O/P EST MOD 30 MIN: CPT | Performed by: NURSE PRACTITIONER

## 2024-09-23 RX ORDER — VALACYCLOVIR HYDROCHLORIDE 1 G/1
1000 TABLET, FILM COATED ORAL 3 TIMES DAILY
Qty: 21 TABLET | Refills: 0 | Status: SHIPPED | OUTPATIENT
Start: 2024-09-23

## 2024-12-02 ENCOUNTER — OFFICE VISIT (OUTPATIENT)
Dept: CARDIOLOGY | Facility: CLINIC | Age: 49
End: 2024-12-02
Payer: COMMERCIAL

## 2024-12-02 VITALS
DIASTOLIC BLOOD PRESSURE: 70 MMHG | BODY MASS INDEX: 42.51 KG/M2 | HEART RATE: 94 BPM | OXYGEN SATURATION: 97 % | WEIGHT: 231 LBS | HEIGHT: 62 IN | SYSTOLIC BLOOD PRESSURE: 120 MMHG

## 2024-12-02 DIAGNOSIS — I31.9 PLEUROPERICARDITIS: Primary | ICD-10-CM

## 2024-12-02 PROCEDURE — 93000 ELECTROCARDIOGRAM COMPLETE: CPT | Performed by: FAMILY MEDICINE

## 2024-12-02 PROCEDURE — 99214 OFFICE O/P EST MOD 30 MIN: CPT | Performed by: FAMILY MEDICINE

## 2024-12-02 NOTE — PROGRESS NOTES
Date of Office Visit: 2024  Encounter Provider: EDWINA Carter  Place of Service: [facility] CARDIOLOGY  Established cardiologist: Liza Queen MD  Patient Name: Lauren Chicas  :1975      Patient ID:  Lauren Chicas is a 49 y.o. female is here for  followup    With a pertinent medical history of  pericarditis associated with her rheumatoid arthritis.  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 Ivette Matias, history of obstructive sleep apnea on CPAP, obesity and chronic iron deficiency anemia.      She is , has 2 children, works as , has never smoked, uses no alcohol, has 2 cups of coffee per day and no drugs.  Her mother and father have hypertension and her maternal grandmother has diabetes.    History of Present Illness  She has a history of pericarditis associated with her rheumatoid arthritis.  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 Ivette Matias, history of obstructive sleep apnea on CPAP, obesity and chronic iron deficiency anemia.     On 12/15/2022, she began noticing her heart racing. On 2022, she woke at 5 AM with a sharp pain in her chest and back as well as short windedness after second dose of Rituxan. She was very nauseated, dyspneic and moved from her bedroom to the couch in her living room where she collapsed and could not get up for about 5 hours. She was then able to move about but felt poorly through the whole weekend she then on 2022 got up to go to work and when she arrived, she did not feel well and looked poorly. A coworker advised her to go to the emergency department. She went to [facility] emergency department and was admitted overnight. She had an echocardiogram on 2022 which showed ejection fraction of 63% with mild mitral tricuspid  insufficiency, normal RVSP and grade 1 diastolic dysfunction. The pericardium was not well visualized. Her ECG showed ST elevation and VA depression insistent with acute pericarditis. CTA of the chest showed no evidence of pulmonary embolism or aortic dissection, really no pleural effusions but small circumferential pericardial fluid, small calcified area in the mid LAD. She was released and felt slightly better.     12/2023 severe chest pain on 1/3/2023 and went to the emergency department at [facility].  Labs on 1/6/2023 - normal CMP, normal lactate, low CK at 25, hemoglobin 7.8, normal platelets and white count, elevated ESR done 1/3/2023 with elevated free kappa and free lambda light chains with a normal free kappa and lambda ratio.  Her protein electrophoresis showed low albumin and increased alpha-1 globulins, just over normal.  Her immunofixation electrophoresis showed elevated IgG, IgA and IgM.  Lipids done 1/3/2023 showed total cholesterol 115, HDL 38, LDL 65, VLDL 12, triglycerides 59, hemoglobin A1c 6%.  She had an echocardiogram done 1/3/2023 at [facility] which showed ejection fraction 59% with no pericardial effusion.  She had a CTA for PE done 1/3/2023 which showed biaxillary lymphadenopathy, no evidence of pulmonary embolism or aortic dissection, small bilateral pleural effusions, nonspecific mild thickening of pericardium.  She did have a lymph node biopsy there-was normal. She was treated with steroids.  Echocardiogram done 1/11/2023 showed ejection fraction 61% with small pericardial effusion, no tamponade.     She started noticing some pain with deep breaths followed by progressive pain in her upper back and across her chest and intermittent heart racing 2/2024.  She weaned off steroids 1/2024-had been on steroids for 2 years prior, after weaning had significant joint aching and fatigue.  She was off of aspirin and colchicine.  When I saw her 2/2024, I felt like her pleuropericarditis was back and we  have restarted her aspirin 325 twice daily and colchicine 0.6 twice daily.  She remains on that at this time.  She feels fine and has no chest pain or difficulty breathing.  She was switched from methotrexate to CellCept thinking that her body aches and pains might be more vasculitic than rheumatoid.  She is also on Plaquenil and Rituxan.  She is hoping to wean off of aspirin and colchicine.  She does not feel her heart racing or skipping.  She has no dizziness, syncope or falls.  She does have fatigue.  She has no orthopnea or PND.     Renal ultrasound done 9/11/2023 showed nonobstructing left renal calculi and no hydronephrosis. She had severe right foot pain and was hospitalized September 2023. The right foot pain was due to her rheumatoid arthritis. She had a hysterectomy November 2023 and did well.     She saw Dr. Queen in the office 7/30/24 and at that time she was doing well and her aspirin and colchicine were decreased.     Rosangela is feeling very well today.  She has done just fine on the lower doses of colchicine and aspirin.  She previously saw Dr. Dusty Burnett with [facility] they are now following her as needed.  She was advised on no long-term NSAIDs and wondering if she can/should discontinue or lower the aspirin.   There is no chest pain or pressure, soa, lui, pnd, lightheadedness, dizziness, presyncope/syncope, leg swelling, heart racing, or palpitations.     Last completed lab work July 2024 cbc unremarkable, CMP unremarkable, normal TSH, total cholesterol 165, , HDL 39, .    Current Outpatient Medications on File Prior to Visit   Medication Sig Dispense Refill    ascorbic acid (VITAMIN C) 1000 MG tablet Daily.      azelaic acid (AZELEX) 15 % gel       BIOTIN PO Take  by mouth 2 (Two) Times a Day.      calcium citrate-vitamin d (CITRACAL) 200-250 MG-UNIT tablet tablet Take  by mouth Daily.      cetirizine (zyrTEC) 10 MG tablet Take 1 tablet by mouth Daily.      colchicine 0.6 MG  "tablet Take 1 tablet by mouth Daily. 90 tablet 1    guaiFENesin (Mucinex) 600 MG 12 hr tablet Take 2 tablets by mouth 2 (Two) Times a Day.      hydrocortisone 2.5 % cream       hydroxychloroquine (PLAQUENIL) 200 MG tablet Take  by mouth 2 (Two) Times a Day. Unsure of dosage      Magic Mouthwash Oral Suspension (diphenhydrAMINE HCl - aluminum & magnesium hydroxide-simethicone - lidocaine) Swish and spit 5 mL Every 4 (Four) Hours As Needed (aphthous ulcers). 400 mL 0    Omega-3 Fatty Acids (FISH OIL PO) Take  by mouth Daily.      predniSONE (DELTASONE) 5 MG tablet       Probiotic Product (PROBIOTIC-10 PO) Take  by mouth Daily.      riTUXimab (RITUXAN) 10 MG/ML solution injection Infuse  into a venous catheter. As directed- every 4 months      [DISCONTINUED] aspirin 325 MG tablet Take 1 tablet by mouth Daily. 90 tablet 1    TART CHERRY PO Once daily      valACYclovir (Valtrex) 1000 MG tablet Take 1 tablet by mouth 3 (Three) Times a Day. 21 tablet 0     No current facility-administered medications on file prior to visit.         Procedures    ECG 12 Lead    Date/Time: 12/2/2024 3:18 PM  Performed by: Ashly Hartman APRN    Authorized by: Ashly Hartman APRN  Comparison: compared with previous ECG from 7/30/2024  Rhythm: sinus rhythm  BPM: 94    Clinical impression: normal ECG              Objective:      Vitals:    12/02/24 1434   BP: 120/70   Pulse: 94   SpO2: 97%   Weight: 105 kg (231 lb)   Height: 157.5 cm (62\")     Body mass index is 42.25 kg/m².  Wt Readings from Last 3 Encounters:   12/02/24 105 kg (231 lb)   09/23/24 103 kg (228 lb)   07/30/24 103 kg (226 lb)     Constitutional:       General: Not in acute distress.     Appearance: Not diaphoretic.   Neck:      Vascular: No carotid bruit or JVD.   Pulmonary:      Effort: Pulmonary effort is normal.      Breath sounds: Normal breath sounds.   Cardiovascular:      Normal rate. Regular rhythm.      Murmurs: There is no murmur.      No gallop.  No rub. "   Pulses:     Intact distal pulses.      Carotid: 2+ bilaterally.     Radial: 2+ bilaterally.     Dorsalis pedis: 2+ bilaterally.     Posterior tibial: 2+ bilaterally.  Edema:     Peripheral edema absent.   Neurological:      Cranial Nerves: No cranial nerve deficit.     Lab Review:      Lab Results   Component Value Date    TSH 0.452 07/03/2024       Lab Results   Component Value Date    CHLPL 165 07/03/2024    CHLPL 168 06/22/2023     Lab Results   Component Value Date    TRIG 115 07/03/2024    TRIG 81 06/22/2023     Lab Results   Component Value Date    HDL 39 (L) 07/03/2024    HDL 61 (H) 06/22/2023     Lab Results   Component Value Date     (H) 07/03/2024    LDL 92 06/22/2023       Lab Results   Component Value Date    WBC 3.26 (L) 07/03/2024    HGB 13.2 07/03/2024    HCT 40.4 07/03/2024    MCV 90.6 07/03/2024     07/03/2024       Lab Results   Component Value Date    GLUCOSE 93 07/03/2024    BUN 13 07/03/2024    CREATININE 0.66 07/03/2024    EGFRRESULT 107.7 07/03/2024    EGFR 103.3 12/20/2022    BCR 19.7 07/03/2024    K 4.1 07/03/2024    CO2 26.7 07/03/2024    CALCIUM 9.8 07/03/2024    PROTENTOTREF 6.9 07/03/2024    ALBUMIN 4.2 07/03/2024    BILITOT 0.4 07/03/2024    AST 21 07/03/2024    ALT 25 07/03/2024       Lab Results   Component Value Date    HGBA1C 5.70 (H) 07/03/2024       Assessment:     1. Pleuropericarditis      Acute pleuropericarditis-.  On aspirin 325 mg 2 times daily and colchicine 0.6 twice daily.  May need anakinra.  She is better, will start weaning these meds.  Rheumatoid arthritis, difficult to manage, follows with Dr. Ivette Matias  Polyarteritis nodosa, has been treated with Rituxan for this-Rituxan was started December 2022, may have reacted to it  Iron deficiency anemia, chronic  MICHAEL on CPAP  Obesity  Hyperlipidemia, untreated    Plan:   Reviewed with Dr. Queen, will decrease her aspirin to 81mg daily  She will let us know how she does with that - follow up with   Seferino in  months.       Thank you for allowing me to participate in this patient's care. Please call with any questions or concerns.          Dragon dictation device was utilized in this note.

## 2024-12-03 ENCOUNTER — TELEPHONE (OUTPATIENT)
Dept: CARDIOLOGY | Facility: CLINIC | Age: 49
End: 2024-12-03
Payer: COMMERCIAL

## 2025-03-24 ENCOUNTER — OFFICE VISIT (OUTPATIENT)
Dept: INTERNAL MEDICINE | Facility: CLINIC | Age: 50
End: 2025-03-24
Payer: COMMERCIAL

## 2025-03-24 VITALS
HEART RATE: 91 BPM | HEIGHT: 62 IN | SYSTOLIC BLOOD PRESSURE: 102 MMHG | DIASTOLIC BLOOD PRESSURE: 60 MMHG | OXYGEN SATURATION: 98 % | WEIGHT: 234 LBS | BODY MASS INDEX: 43.06 KG/M2

## 2025-03-24 DIAGNOSIS — R00.2 HEART PALPITATIONS: ICD-10-CM

## 2025-03-24 DIAGNOSIS — R06.02 SOB (SHORTNESS OF BREATH): ICD-10-CM

## 2025-03-24 DIAGNOSIS — R31.9 HEMATURIA, UNSPECIFIED TYPE: Primary | ICD-10-CM

## 2025-03-24 PROCEDURE — 99214 OFFICE O/P EST MOD 30 MIN: CPT | Performed by: NURSE PRACTITIONER

## 2025-03-24 RX ORDER — MYCOPHENOLATE MOFETIL 500 MG/1
TABLET ORAL
COMMUNITY
Start: 2025-03-10

## 2025-03-24 RX ORDER — NITROFURANTOIN 25; 75 MG/1; MG/1
100 CAPSULE ORAL 2 TIMES DAILY
Qty: 10 CAPSULE | Refills: 0 | Status: SHIPPED | OUTPATIENT
Start: 2025-03-24

## 2025-03-24 NOTE — PROGRESS NOTES
"Tristian Chicas is a 49 y.o. female.     History of Present Illness    The patient is here today with c/o rheum noted blood in urine. She was leaving on vacation and was asymptomatic. She does note the last few days she does not feel great. Returned from vacation 3 weeks ago.   No hx of kidney stones.   The following portions of the patient's history were reviewed and updated as appropriate: allergies, current medications, past family history, past medical history, past social history, past surgical history and problem list.    Review of Systems   Constitutional: Negative.    HENT:  Positive for rhinorrhea.    Respiratory:  Positive for shortness of breath (a little bit over the last few days.). Negative for cough, wheezing and stridor.    Cardiovascular:  Positive for palpitations (\"feels off\", not sure if it feels like a heart palpitatons.) and leg swelling (at baseline). Negative for chest pain.   Gastrointestinal:  Positive for nausea (a little bit last week).   Genitourinary:  Positive for flank pain (bilaterally) and frequency (yesterday). Negative for dysuria, hematuria and urgency.   Allergic/Immunologic: Positive for environmental allergies.   Neurological:  Positive for headache (a little bit last week, to see eye spec tomorrow).       Objective   Physical Exam  Constitutional:       Appearance: She is well-developed.   Neck:      Thyroid: No thyromegaly.   Cardiovascular:      Rate and Rhythm: Normal rate and regular rhythm.      Heart sounds: Normal heart sounds.   Pulmonary:      Effort: Pulmonary effort is normal.      Breath sounds: Normal breath sounds.   Abdominal:      Tenderness: There is no right CVA tenderness or left CVA tenderness.   Musculoskeletal:      Cervical back: Normal range of motion and neck supple.      Right lower leg: Edema (mild) present.      Left lower leg: Edema (mod) present.   Lymphadenopathy:      Cervical: No cervical adenopathy.   Skin:     General: Skin is warm " and dry.   Psychiatric:         Behavior: Behavior normal.         Thought Content: Thought content normal.         Judgment: Judgment normal.         Vitals:    03/24/25 1429   BP: 102/60   Pulse: 91   SpO2: 98%     Body mass index is 42.79 kg/m².      Current Outpatient Medications:     ascorbic acid (VITAMIN C) 1000 MG tablet, Daily., Disp: , Rfl:     Aspirin 81 MG capsule, Take 81 mg by mouth Daily., Disp: 90 capsule, Rfl: 3    azelaic acid (AZELEX) 15 % gel, , Disp: , Rfl:     BIOTIN PO, Take  by mouth 2 (Two) Times a Day., Disp: , Rfl:     calcium citrate-vitamin d (CITRACAL) 200-250 MG-UNIT tablet tablet, Take  by mouth Daily., Disp: , Rfl:     cetirizine (zyrTEC) 10 MG tablet, Take 1 tablet by mouth Daily., Disp: , Rfl:     colchicine 0.6 MG tablet, Take 1 tablet by mouth Daily., Disp: 90 tablet, Rfl: 1    guaiFENesin (Mucinex) 600 MG 12 hr tablet, Take 2 tablets by mouth 2 (Two) Times a Day., Disp: , Rfl:     hydrocortisone 2.5 % cream, , Disp: , Rfl:     hydroxychloroquine (PLAQUENIL) 200 MG tablet, Take  by mouth 2 (Two) Times a Day. Unsure of dosage, Disp: , Rfl:     Magic Mouthwash Oral Suspension (diphenhydrAMINE HCl - aluminum & magnesium hydroxide-simethicone - lidocaine), Swish and spit 5 mL Every 4 (Four) Hours As Needed (aphthous ulcers)., Disp: 400 mL, Rfl: 0    mycophenolate (CELLCEPT) 500 MG tablet, , Disp: , Rfl:     Omega-3 Fatty Acids (FISH OIL PO), Take  by mouth Daily., Disp: , Rfl:     predniSONE (DELTASONE) 5 MG tablet, , Disp: , Rfl:     Probiotic Product (PROBIOTIC-10 PO), Take  by mouth Daily., Disp: , Rfl:     riTUXimab (RITUXAN) 10 MG/ML solution injection, Infuse  into a venous catheter. As directed- every 4 months, Disp: , Rfl:     Unable to find, Take  by mouth., Disp: , Rfl:     Zinc Acetate, Oral, (ZINC ACETATE PO), Take  by mouth., Disp: , Rfl:     nitrofurantoin, macrocrystal-monohydrate, (Macrobid) 100 MG capsule, Take 1 capsule by mouth 2 (Two) Times a Day., Disp: 10  capsule, Rfl: 0   Assessment & Plan   Diagnoses and all orders for this visit:    1. Hematuria, unspecified type (Primary)  -     POCT urinalysis dipstick, automated  -     Urine Culture - Urine, Urine, Clean Catch  -     Urinalysis With Microscopic - Urine, Clean Catch  -     nitrofurantoin, macrocrystal-monohydrate, (Macrobid) 100 MG capsule; Take 1 capsule by mouth 2 (Two) Times a Day.  Dispense: 10 capsule; Refill: 0    2. SOB (shortness of breath)    3. Heart palpitations                 1. Hematuria- check urine micro and cx, will send in macrobid in case of worsening symptoms  2. SOB and heart palpitations- EKG machine broken, symptoms are mild and intermittent (not currently active), if this re occurs and is persistent needs to go to the ER

## 2025-03-27 ENCOUNTER — TELEPHONE (OUTPATIENT)
Dept: INTERNAL MEDICINE | Facility: CLINIC | Age: 50
End: 2025-03-27
Payer: COMMERCIAL

## 2025-03-27 LAB
APPEARANCE UR: CLEAR
BACTERIA #/AREA URNS HPF: ABNORMAL /[HPF]
BACTERIA UR CULT: ABNORMAL
BACTERIA UR CULT: ABNORMAL
BILIRUB UR QL STRIP: NEGATIVE
CASTS URNS QL MICRO: ABNORMAL /LPF
COLOR UR: YELLOW
EPI CELLS #/AREA URNS HPF: ABNORMAL /HPF (ref 0–10)
GLUCOSE UR QL STRIP: NEGATIVE
HGB UR QL STRIP: ABNORMAL
KETONES UR QL STRIP: NEGATIVE
LEUKOCYTE ESTERASE UR QL STRIP: ABNORMAL
MICRO URNS: ABNORMAL
NITRITE UR QL STRIP: NEGATIVE
OTHER ANTIBIOTIC SUSC ISLT: ABNORMAL
PH UR STRIP: 6 [PH] (ref 5–7.5)
PROT UR QL STRIP: NEGATIVE
RBC #/AREA URNS HPF: ABNORMAL /HPF (ref 0–2)
SP GR UR STRIP: 1.01 (ref 1–1.03)
UROBILINOGEN UR STRIP-MCNC: 0.2 MG/DL (ref 0.2–1)
WBC #/AREA URNS HPF: ABNORMAL /HPF (ref 0–5)

## 2025-03-27 NOTE — TELEPHONE ENCOUNTER
Patient is wanting a phone call in regards to test results. She needs to know what her next steps are.

## 2025-03-31 ENCOUNTER — TELEPHONE (OUTPATIENT)
Dept: INTERNAL MEDICINE | Facility: CLINIC | Age: 50
End: 2025-03-31

## 2025-03-31 NOTE — TELEPHONE ENCOUNTER
Caller: Lauren Chicas    Relationship: Self    Best call back number: [phone]     What medication are you requesting:      What are your current symptoms:      How long have you been experiencing symptoms:      Have you had these symptoms before:    [] Yes  [] No    Have you been treated for these symptoms before:   [] Yes  [] No    If a prescription is needed, what is your preferred pharmacy and phone number: [facility] [id number] - [address] - [phone]  - [phone]      Additional notes: PATIENT CALLED STATED THAT SHE HAS ONE DAY LEFT OF THE ANTIBIOTIC FOR THE UTI.  BUT SHE IS STARTING TO HAVE THE SYMPTOMS OF PAINFUL URINATION, BLEEDING.  SHE WANTS TO KNOW WHY SHE IS NOW HAVING THE SYMPTOMS TODAY DUE TO SHE DID NOT HAVE THE SYMPTOMS WHEN SHE DONE THE URINE CULTURE THAT SHOWED UTI.  PLEASE CALL HER BACK TO DISCUSS.

## 2025-03-31 NOTE — TELEPHONE ENCOUNTER
I called pt she still has a day left of the abx and she feels like she is having a period but she isnt   she is going to call her gyn since edy arora here

## 2025-05-14 ENCOUNTER — OFFICE VISIT (OUTPATIENT)
Dept: INTERNAL MEDICINE | Facility: CLINIC | Age: 50
End: 2025-05-14
Payer: COMMERCIAL

## 2025-05-14 VITALS
OXYGEN SATURATION: 99 % | DIASTOLIC BLOOD PRESSURE: 72 MMHG | HEIGHT: 62 IN | HEART RATE: 81 BPM | SYSTOLIC BLOOD PRESSURE: 114 MMHG | WEIGHT: 230 LBS | BODY MASS INDEX: 42.33 KG/M2

## 2025-05-14 DIAGNOSIS — N95.2 VAGINAL ATROPHY: ICD-10-CM

## 2025-05-14 DIAGNOSIS — M19.071 OSTEOARTHRITIS OF RIGHT FOOT, UNSPECIFIED OSTEOARTHRITIS TYPE: ICD-10-CM

## 2025-05-14 DIAGNOSIS — R35.0 URINARY FREQUENCY: Primary | ICD-10-CM

## 2025-05-14 DIAGNOSIS — N39.0 FREQUENT UTI: ICD-10-CM

## 2025-05-14 LAB
BILIRUB BLD-MCNC: NEGATIVE MG/DL
CLARITY, POC: CLEAR
COLOR UR: YELLOW
EXPIRATION DATE: ABNORMAL
GLUCOSE UR STRIP-MCNC: NEGATIVE MG/DL
KETONES UR QL: NEGATIVE
LEUKOCYTE EST, POC: ABNORMAL
Lab: ABNORMAL
NITRITE UR-MCNC: NEGATIVE MG/ML
PH UR: 6 [PH] (ref 5–8)
PROT UR STRIP-MCNC: NEGATIVE MG/DL
RBC # UR STRIP: ABNORMAL /UL
SP GR UR: 1.02 (ref 1–1.03)
UROBILINOGEN UR QL: ABNORMAL

## 2025-05-14 PROCEDURE — 81003 URINALYSIS AUTO W/O SCOPE: CPT | Performed by: NURSE PRACTITIONER

## 2025-05-14 PROCEDURE — 99214 OFFICE O/P EST MOD 30 MIN: CPT | Performed by: NURSE PRACTITIONER

## 2025-05-14 RX ORDER — NITROFURANTOIN 25; 75 MG/1; MG/1
100 CAPSULE ORAL 2 TIMES DAILY
Qty: 10 CAPSULE | Refills: 0 | Status: SHIPPED | OUTPATIENT
Start: 2025-05-14

## 2025-05-14 RX ORDER — LANOLIN ALCOHOL/MO/W.PET/CERES
1000 CREAM (GRAM) TOPICAL DAILY
COMMUNITY

## 2025-05-14 RX ORDER — LIDOCAINE 50 MG/G
1 PATCH TOPICAL EVERY 24 HOURS
Qty: 9 EACH | Refills: 3 | Status: SHIPPED | OUTPATIENT
Start: 2025-05-14

## 2025-05-14 RX ORDER — ESTRADIOL 0.1 MG/G
2 CREAM VAGINAL 2 TIMES WEEKLY
Qty: 42.5 G | Refills: 11 | Status: SHIPPED | OUTPATIENT
Start: 2025-05-15

## 2025-05-14 NOTE — PROGRESS NOTES
Tristian Chicas is a 49 y.o. female.     History of Present Illness    The patient is here today with c/o intermittent low back pain, at night prior to bed will some frequency.   Total hyst about 1 1/2 yrs ago.     Rt foot with a lot of pain, with osteo and RA, she was hospitalized in 2023 for this. She had lidocaine patches on Rx then which really help.     The following portions of the patient's history were reviewed and updated as appropriate: allergies, current medications, past family history, past medical history, past social history, past surgical history and problem list.    Review of Systems   Constitutional: Negative.    Respiratory: Negative.     Cardiovascular: Negative.    Genitourinary:  Positive for flank pain (occ, could be days in between) and frequency. Negative for dysuria, hematuria, vaginal bleeding, vaginal discharge and vaginal pain.       Objective   Physical Exam  Constitutional:       Appearance: Normal appearance. She is well-developed.   Neck:      Thyroid: No thyromegaly.   Cardiovascular:      Rate and Rhythm: Normal rate and regular rhythm.      Heart sounds: Normal heart sounds.      Comments: Mild   Pulmonary:      Effort: Pulmonary effort is normal.      Breath sounds: Normal breath sounds.   Musculoskeletal:      Cervical back: Normal range of motion and neck supple.      Right lower leg: Edema present.      Left lower leg: Edema present.   Lymphadenopathy:      Cervical: No cervical adenopathy.   Skin:     General: Skin is warm and dry.   Neurological:      Mental Status: She is alert.   Psychiatric:         Behavior: Behavior normal.         Thought Content: Thought content normal.         Judgment: Judgment normal.         Vitals:    05/14/25 1335   BP: 114/72   Pulse: 81   SpO2: 99%     Body mass index is 42.06 kg/m².      Current Outpatient Medications:     ascorbic acid (VITAMIN C) 1000 MG tablet, Daily., Disp: , Rfl:     Aspirin 81 MG capsule, Take 81 mg by mouth  Daily., Disp: 90 capsule, Rfl: 3    azelaic acid (AZELEX) 15 % gel, , Disp: , Rfl:     BIOTIN PO, Take  by mouth 2 (Two) Times a Day., Disp: , Rfl:     calcium citrate-vitamin d (CITRACAL) 200-250 MG-UNIT tablet tablet, Take  by mouth Daily., Disp: , Rfl:     cetirizine (zyrTEC) 10 MG tablet, Take 1 tablet by mouth Daily., Disp: , Rfl:     colchicine 0.6 MG tablet, Take 1 tablet by mouth Daily., Disp: 90 tablet, Rfl: 1    guaiFENesin (Mucinex) 600 MG 12 hr tablet, Take 2 tablets by mouth 2 (Two) Times a Day., Disp: , Rfl:     hydrocortisone 2.5 % cream, , Disp: , Rfl:     hydroxychloroquine (PLAQUENIL) 200 MG tablet, Take  by mouth 2 (Two) Times a Day. Unsure of dosage, Disp: , Rfl:     mycophenolate (CELLCEPT) 500 MG tablet, , Disp: , Rfl:     Omega-3 Fatty Acids (FISH OIL PO), Take  by mouth Daily., Disp: , Rfl:     predniSONE (DELTASONE) 5 MG tablet, , Disp: , Rfl:     Probiotic Product (PROBIOTIC-10 PO), Take  by mouth Daily., Disp: , Rfl:     riTUXimab (RITUXAN) 10 MG/ML solution injection, Infuse  into a venous catheter. As directed- every 4 months, Disp: , Rfl:     Zinc Acetate, Oral, (ZINC ACETATE PO), Take  by mouth., Disp: , Rfl:     [START ON 5/15/2025] estradiol (ESTRACE VAGINAL) 0.1 MG/GM vaginal cream, Insert 2 g into the vagina 2 (Two) Times a Week., Disp: 42.5 g, Rfl: 11    lidocaine (LIDODERM) 5 %, Place 1 patch on the skin as directed by provider Daily. Remove & Discard patch within 12 hours or as directed by MD, Disp: 9 each, Rfl: 3    nitrofurantoin, macrocrystal-monohydrate, (Macrobid) 100 MG capsule, Take 1 capsule by mouth 2 (Two) Times a Day., Disp: 10 capsule, Rfl: 0    vitamin B-12 (CYANOCOBALAMIN) 1000 MCG tablet, Take 1 tablet by mouth Daily., Disp: , Rfl:    Assessment & Plan   Diagnoses and all orders for this visit:    1. Urinary frequency (Primary)  -     POCT urinalysis dipstick, automated  -     Urine Culture - Urine, Urine, Clean Catch  -     nitrofurantoin,  macrocrystal-monohydrate, (Macrobid) 100 MG capsule; Take 1 capsule by mouth 2 (Two) Times a Day.  Dispense: 10 capsule; Refill: 0    2. Osteoarthritis of right foot, unspecified osteoarthritis type  -     lidocaine (LIDODERM) 5 %; Place 1 patch on the skin as directed by provider Daily. Remove & Discard patch within 12 hours or as directed by MD  Dispense: 9 each; Refill: 3  -     Ambulatory Referral to Podiatry    3. Vaginal atrophy  -     estradiol (ESTRACE VAGINAL) 0.1 MG/GM vaginal cream; Insert 2 g into the vagina 2 (Two) Times a Week.  Dispense: 42.5 g; Refill: 11    4. Frequent UTI  -     estradiol (ESTRACE VAGINAL) 0.1 MG/GM vaginal cream; Insert 2 g into the vagina 2 (Two) Times a Week.  Dispense: 42.5 g; Refill: 11                 1. Urinary frequency- check a cx, if neg consider vaginal estrogen vs urogyn referral   Will send in macrobid   2. Osteoarthritis- will Rx lidocaine, she is being treated for RA, if needed can place podiatry referral

## 2025-05-19 ENCOUNTER — TELEPHONE (OUTPATIENT)
Dept: INTERNAL MEDICINE | Facility: CLINIC | Age: 50
End: 2025-05-19
Payer: COMMERCIAL

## 2025-05-19 LAB
BACTERIA UR CULT: ABNORMAL
OTHER ANTIBIOTIC SUSC ISLT: ABNORMAL

## 2025-05-19 NOTE — TELEPHONE ENCOUNTER
Seen at ED on Friday with chest and stomach pain. Blood work done as well as chest xray and CT of abdomen and pelvis. Could not find anything and sent her home. The pain returned last night and wants to be seen. There is not availability where should I put her?

## 2025-05-20 ENCOUNTER — TELEPHONE (OUTPATIENT)
Dept: INTERNAL MEDICINE | Facility: CLINIC | Age: 50
End: 2025-05-20

## 2025-05-20 ENCOUNTER — OFFICE VISIT (OUTPATIENT)
Dept: INTERNAL MEDICINE | Facility: CLINIC | Age: 50
End: 2025-05-20
Payer: COMMERCIAL

## 2025-05-20 VITALS
HEIGHT: 62 IN | SYSTOLIC BLOOD PRESSURE: 122 MMHG | TEMPERATURE: 98.7 F | BODY MASS INDEX: 41.96 KG/M2 | HEART RATE: 82 BPM | OXYGEN SATURATION: 96 % | DIASTOLIC BLOOD PRESSURE: 84 MMHG | WEIGHT: 228 LBS

## 2025-05-20 DIAGNOSIS — N30.00 ACUTE CYSTITIS WITHOUT HEMATURIA: ICD-10-CM

## 2025-05-20 DIAGNOSIS — R11.0 NAUSEA: ICD-10-CM

## 2025-05-20 DIAGNOSIS — R10.11 RUQ ABDOMINAL PAIN: Primary | ICD-10-CM

## 2025-05-20 PROCEDURE — 99214 OFFICE O/P EST MOD 30 MIN: CPT | Performed by: NURSE PRACTITIONER

## 2025-05-20 RX ORDER — CEFDINIR 300 MG/1
300 CAPSULE ORAL 2 TIMES DAILY
Qty: 10 CAPSULE | Refills: 0 | Status: SHIPPED | OUTPATIENT
Start: 2025-05-20

## 2025-05-20 RX ORDER — PANTOPRAZOLE SODIUM 40 MG/1
40 TABLET, DELAYED RELEASE ORAL DAILY
COMMUNITY
Start: 2025-05-16

## 2025-05-20 NOTE — TELEPHONE ENCOUNTER
Called in ----- Message from Lynn Baldev sent at 5/20/2025  8:43 AM EDT -----  Please let the patient know that she should not take the Macrobid.  Omnicef has been sent in 1 tab p.o. twice daily x 5 days.  It does interact with her CellCept.

## 2025-05-20 NOTE — PROGRESS NOTES
Tristian Chicas is a 49 y.o. female.     History of Present Illness    The patient is here today with c/o abd pain that started Friday morning. Felt like a gnawing pain. Felt nauseated and felt it up in to her chest. She went and ran a few errands. The pain would ease and then return. This occurred from noon to 6 pm. Went to the ER at the point. Prue completely fine Saturday and Sunday. Then Sunday at 6pm- started all over again. Miserable all night. She did have a little belching and abd distention. Did have a normal BM Sunday middle of the night. Yesterday ok and today ok so far.     UTI- pos urine cx, has not taken macrobid yet.     ER visit May 16 for epigastric pain and chest pain.  EKG normal.  Makk blood cell 4.39.  CMP BUN 22.  BMP normal, magnesium normal, troponin normal, INR normal.  Chest x-ray negative.  CT abdomen pelvis showed a 5 mm left renal calculus in the lower pole.  No hydronephrosis or obstructing stones.  Mild aortic atherosclerotic calcification.  Multilevel degenerative changes of the lumbar spine.  Otherwise negative acute.    Patient was started on Protonix, just got it and started it last night.   The following portions of the patient's history were reviewed and updated as appropriate: allergies, current medications, past family history, past medical history, past social history, past surgical history and problem list.    Review of Systems   Constitutional: Negative.    Respiratory: Negative.     Cardiovascular:  Negative for chest pain (not the last few days), palpitations and leg swelling.   Gastrointestinal:  Positive for abdominal distention (a little sunday night), abdominal pain and nausea. Negative for anal bleeding, blood in stool, constipation, diarrhea, vomiting, GERD and indigestion.   Genitourinary:  Positive for frequency and urgency. Negative for dysuria, flank pain, genital sores, pelvic pain and pelvic pressure.       Objective   Physical Exam  Constitutional:        Appearance: Normal appearance. She is well-developed.   Neck:      Thyroid: No thyromegaly.   Cardiovascular:      Rate and Rhythm: Normal rate and regular rhythm.      Heart sounds: Normal heart sounds.   Pulmonary:      Effort: Pulmonary effort is normal.      Breath sounds: Normal breath sounds.   Abdominal:      General: Abdomen is flat. Bowel sounds are normal.      Palpations: Abdomen is soft.      Tenderness: There is abdominal tenderness in the right upper quadrant. There is no right CVA tenderness or left CVA tenderness.   Musculoskeletal:      Cervical back: Normal range of motion and neck supple.      Right lower leg: Edema (mild) present.   Lymphadenopathy:      Cervical: No cervical adenopathy.   Skin:     General: Skin is warm and dry.   Neurological:      Mental Status: She is alert.   Psychiatric:         Behavior: Behavior normal.         Thought Content: Thought content normal.         Judgment: Judgment normal.         Vitals:    05/20/25 0753   BP: 122/84   Pulse: 82   Temp: 98.7 °F (37.1 °C)   SpO2: 96%     Body mass index is 41.69 kg/m².      Assessment & Plan   Diagnoses and all orders for this visit:    1. RUQ abdominal pain (Primary)  -     US Gallbladder; Future    2. Acute cystitis without hematuria  -     cefdinir (OMNICEF) 300 MG capsule; Take 1 capsule by mouth 2 (Two) Times a Day.  Dispense: 10 capsule; Refill: 0    3. Nausea  -     US Gallbladder; Future                 1. RUQ abd pain/nausea- check GB, try protonix, if GB testing is neg will refer to GI   Low fat diet   2. UTI-do not take macrobid, I on culture, will give omnicef - probiotic daily

## 2025-05-30 ENCOUNTER — HOSPITAL ENCOUNTER (OUTPATIENT)
Dept: ULTRASOUND IMAGING | Facility: HOSPITAL | Age: 50
Discharge: HOME | End: 2025-05-30
Admitting: NURSE PRACTITIONER
Payer: COMMERCIAL

## 2025-05-30 DIAGNOSIS — R10.11 RUQ ABDOMINAL PAIN: ICD-10-CM

## 2025-05-30 DIAGNOSIS — R11.0 NAUSEA: ICD-10-CM

## 2025-05-30 PROCEDURE — 76705 ECHO EXAM OF ABDOMEN: CPT

## 2025-06-01 PROBLEM — K76.0 FATTY LIVER: Status: ACTIVE | Noted: 2025-06-01

## 2025-08-04 ENCOUNTER — OFFICE VISIT (OUTPATIENT)
Dept: INTERNAL MEDICINE | Facility: CLINIC | Age: 50
End: 2025-08-04
Payer: COMMERCIAL

## 2025-08-04 VITALS
OXYGEN SATURATION: 99 % | HEIGHT: 62 IN | BODY MASS INDEX: 40.85 KG/M2 | SYSTOLIC BLOOD PRESSURE: 118 MMHG | WEIGHT: 222 LBS | DIASTOLIC BLOOD PRESSURE: 68 MMHG | HEART RATE: 83 BPM

## 2025-08-04 DIAGNOSIS — I31.9 PLEUROPERICARDITIS: ICD-10-CM

## 2025-08-04 DIAGNOSIS — E78.2 MIXED HYPERLIPIDEMIA: ICD-10-CM

## 2025-08-04 DIAGNOSIS — M06.9 RHEUMATOID ARTHRITIS, INVOLVING UNSPECIFIED SITE, UNSPECIFIED WHETHER RHEUMATOID FACTOR PRESENT: ICD-10-CM

## 2025-08-04 DIAGNOSIS — I77.6 VASCULITIS: ICD-10-CM

## 2025-08-04 DIAGNOSIS — Z00.00 HEALTH CARE MAINTENANCE: Primary | ICD-10-CM

## 2025-08-04 PROCEDURE — 99396 PREV VISIT EST AGE 40-64: CPT | Performed by: NURSE PRACTITIONER

## 2025-08-04 RX ORDER — CLINDAMYCIN HYDROCHLORIDE 300 MG/1
300 CAPSULE ORAL
COMMUNITY
Start: 2025-08-04 | End: 2025-08-11

## (undated) DEVICE — CANN O2 ETCO2 FITS ALL CONN CO2 SMPL A/ 7IN DISP LF

## (undated) DEVICE — SENSR O2 OXIMAX FNGR A/ 18IN NONSTR

## (undated) DEVICE — ADAPT CLN BIOGUARD AIR/H2O DISP

## (undated) DEVICE — TUBING, SUCTION, 1/4" X 10', STRAIGHT: Brand: MEDLINE

## (undated) DEVICE — LN SMPL CO2 SHTRM SD STREAM W/M LUER

## (undated) DEVICE — KT ORCA ORCAPOD DISP STRL